# Patient Record
Sex: MALE | Race: WHITE | NOT HISPANIC OR LATINO | Employment: FULL TIME | URBAN - METROPOLITAN AREA
[De-identification: names, ages, dates, MRNs, and addresses within clinical notes are randomized per-mention and may not be internally consistent; named-entity substitution may affect disease eponyms.]

---

## 2023-02-17 PROBLEM — M21.41 FALLEN ARCHES: Status: ACTIVE | Noted: 2023-02-17

## 2023-02-17 PROBLEM — M47.816 OSTEOARTHRITIS OF LUMBAR SPINE: Status: ACTIVE | Noted: 2023-02-17

## 2023-02-17 PROBLEM — B35.6 TINEA CRURIS: Status: ACTIVE | Noted: 2023-02-17

## 2023-02-17 PROBLEM — N28.1 RENAL CYST, ACQUIRED: Status: ACTIVE | Noted: 2023-02-17

## 2023-02-17 PROBLEM — N52.9 MALE ERECTILE DISORDER OF ORGANIC ORIGIN: Status: ACTIVE | Noted: 2023-02-17

## 2023-02-17 PROBLEM — D12.6 TUBULAR ADENOMA OF COLON: Status: ACTIVE | Noted: 2023-02-17

## 2023-02-17 PROBLEM — Z97.3 WEARS CONTACT LENSES: Status: ACTIVE | Noted: 2023-02-17

## 2023-02-17 PROBLEM — M21.42 FALLEN ARCHES: Status: ACTIVE | Noted: 2023-02-17

## 2023-02-17 PROBLEM — K21.9 GERD WITHOUT ESOPHAGITIS: Status: ACTIVE | Noted: 2023-02-17

## 2023-02-17 PROBLEM — N40.0 BENIGN ENLARGEMENT OF PROSTATE: Status: ACTIVE | Noted: 2023-02-17

## 2023-02-17 PROBLEM — L40.9 PSORIASIS: Status: ACTIVE | Noted: 2023-02-17

## 2023-02-17 PROBLEM — L21.9 SEBORRHEA: Status: ACTIVE | Noted: 2023-02-17

## 2023-02-17 PROBLEM — B35.4 TINEA CORPORIS: Status: ACTIVE | Noted: 2023-02-17

## 2023-02-17 PROBLEM — Z97.3 WEARS GLASSES: Status: ACTIVE | Noted: 2023-02-17

## 2023-02-17 PROBLEM — E78.5 DYSLIPIDEMIA: Status: ACTIVE | Noted: 2023-02-17

## 2023-02-17 PROBLEM — E29.1 HYPOGONADISM MALE: Status: ACTIVE | Noted: 2023-02-17

## 2023-02-17 PROBLEM — R20.2 TINGLING IN EXTREMITIES: Status: ACTIVE | Noted: 2023-02-17

## 2023-02-17 PROBLEM — M17.12 PRIMARY OSTEOARTHRITIS OF LEFT KNEE: Status: ACTIVE | Noted: 2023-02-17

## 2023-02-17 PROBLEM — R97.20 INCREASED PROSTATE SPECIFIC ANTIGEN (PSA) VELOCITY: Status: ACTIVE | Noted: 2023-02-17

## 2023-02-17 PROBLEM — E55.9 VITAMIN D DEFICIENCY: Status: ACTIVE | Noted: 2023-02-17

## 2023-02-17 PROBLEM — G57.92 NEURITIS OF LEFT FOOT: Status: ACTIVE | Noted: 2023-02-17

## 2023-02-17 PROBLEM — G47.00 INSOMNIA: Status: ACTIVE | Noted: 2023-02-17

## 2023-02-17 PROBLEM — L03.039 PARONYCHIA OF TOENAIL: Status: ACTIVE | Noted: 2023-02-17

## 2023-02-17 PROBLEM — Z98.890 H/O MITRAL VALVE REPAIR: Status: ACTIVE | Noted: 2023-02-17

## 2023-02-17 RX ORDER — TESTOSTERONE 50 MG/5G
GEL TRANSDERMAL
COMMUNITY
Start: 2013-01-22 | End: 2023-04-27

## 2023-02-17 RX ORDER — HALOBETASOL PROPIONATE AND TAZAROTENE .1; .45 MG/G; MG/G
LOTION TOPICAL
COMMUNITY

## 2023-02-17 RX ORDER — TACROLIMUS 1 MG/G
OINTMENT TOPICAL
COMMUNITY

## 2023-02-17 RX ORDER — FLUOCINOLONE ACETONIDE 0.11 MG/ML
OIL TOPICAL
COMMUNITY

## 2023-02-17 RX ORDER — ZOLPIDEM TARTRATE 5 MG/1
TABLET ORAL
COMMUNITY
Start: 2015-08-27 | End: 2023-05-01

## 2023-02-17 RX ORDER — FAMOTIDINE 10 MG/1
10 TABLET ORAL DAILY PRN
COMMUNITY
End: 2023-03-31 | Stop reason: ALTCHOICE

## 2023-02-17 RX ORDER — SILDENAFIL CITRATE 20 MG/1
TABLET ORAL
COMMUNITY
Start: 2021-10-18 | End: 2023-03-31 | Stop reason: ALTCHOICE

## 2023-03-24 LAB
ALANINE AMINOTRANSFERASE (SGPT) (U/L) IN SER/PLAS: 18 U/L (ref 10–52)
ALBUMIN (G/DL) IN SER/PLAS: 4.3 G/DL (ref 3.4–5)
ALKALINE PHOSPHATASE (U/L) IN SER/PLAS: 57 U/L (ref 33–136)
ASPARTATE AMINOTRANSFERASE (SGOT) (U/L) IN SER/PLAS: 21 U/L (ref 9–39)
BILIRUBIN DIRECT (MG/DL) IN SER/PLAS: 0.2 MG/DL (ref 0–0.3)
BILIRUBIN TOTAL (MG/DL) IN SER/PLAS: 0.9 MG/DL (ref 0–1.2)
CHOLESTEROL (MG/DL) IN SER/PLAS: 183 MG/DL (ref 0–199)
CHOLESTEROL IN HDL (MG/DL) IN SER/PLAS: 65.9 MG/DL
CHOLESTEROL/HDL RATIO: 2.8
ERYTHROCYTE DISTRIBUTION WIDTH (RATIO) BY AUTOMATED COUNT: 14 % (ref 11.5–14.5)
ERYTHROCYTE MEAN CORPUSCULAR HEMOGLOBIN CONCENTRATION (G/DL) BY AUTOMATED: 32.5 G/DL (ref 32–36)
ERYTHROCYTE MEAN CORPUSCULAR VOLUME (FL) BY AUTOMATED COUNT: 100 FL (ref 80–100)
ERYTHROCYTES (10*6/UL) IN BLOOD BY AUTOMATED COUNT: 4.52 X10E12/L (ref 4.5–5.9)
HEMATOCRIT (%) IN BLOOD BY AUTOMATED COUNT: 45.3 % (ref 41–52)
HEMOGLOBIN (G/DL) IN BLOOD: 14.7 G/DL (ref 13.5–17.5)
LDL: 93 MG/DL (ref 0–99)
LEUKOCYTES (10*3/UL) IN BLOOD BY AUTOMATED COUNT: 5.9 X10E9/L (ref 4.4–11.3)
PLATELETS (10*3/UL) IN BLOOD AUTOMATED COUNT: 274 X10E9/L (ref 150–450)
PROTEIN TOTAL: 7.1 G/DL (ref 6.4–8.2)
THYROTROPIN (MIU/L) IN SER/PLAS BY DETECTION LIMIT <= 0.05 MIU/L: 2.96 MIU/L (ref 0.44–3.98)
TRIGLYCERIDE (MG/DL) IN SER/PLAS: 119 MG/DL (ref 0–149)
VLDL: 24 MG/DL (ref 0–40)

## 2023-03-30 LAB
TESTOSTERONE FREE (CHAN): 113.4 PG/ML (ref 35–155)
TESTOSTERONE,TOTAL,LC-MS/MS: 751 NG/DL (ref 250–1100)

## 2023-03-31 ENCOUNTER — OFFICE VISIT (OUTPATIENT)
Dept: PRIMARY CARE | Facility: CLINIC | Age: 63
End: 2023-03-31
Payer: COMMERCIAL

## 2023-03-31 VITALS
DIASTOLIC BLOOD PRESSURE: 80 MMHG | WEIGHT: 167.2 LBS | SYSTOLIC BLOOD PRESSURE: 119 MMHG | OXYGEN SATURATION: 99 % | HEIGHT: 74 IN | TEMPERATURE: 97.5 F | HEART RATE: 70 BPM | BODY MASS INDEX: 21.46 KG/M2

## 2023-03-31 DIAGNOSIS — E29.1 HYPOGONADISM MALE: Chronic | ICD-10-CM

## 2023-03-31 DIAGNOSIS — G47.01 INSOMNIA DUE TO MEDICAL CONDITION: Chronic | ICD-10-CM

## 2023-03-31 DIAGNOSIS — N40.1 BENIGN PROSTATIC HYPERPLASIA WITH NOCTURIA: Primary | Chronic | ICD-10-CM

## 2023-03-31 DIAGNOSIS — Z98.890 H/O MITRAL VALVE REPAIR: ICD-10-CM

## 2023-03-31 DIAGNOSIS — R35.1 BENIGN PROSTATIC HYPERPLASIA WITH NOCTURIA: Primary | Chronic | ICD-10-CM

## 2023-03-31 PROBLEM — N40.0 BENIGN ENLARGEMENT OF PROSTATE: Chronic | Status: ACTIVE | Noted: 2023-02-17

## 2023-03-31 PROBLEM — G47.00 INSOMNIA: Chronic | Status: ACTIVE | Noted: 2023-02-17

## 2023-03-31 PROCEDURE — 1036F TOBACCO NON-USER: CPT | Performed by: NURSE PRACTITIONER

## 2023-03-31 PROCEDURE — 99214 OFFICE O/P EST MOD 30 MIN: CPT | Performed by: NURSE PRACTITIONER

## 2023-03-31 ASSESSMENT — ENCOUNTER SYMPTOMS
OCCASIONAL FEELINGS OF UNSTEADINESS: 0
DEPRESSION: 0
LOSS OF SENSATION IN FEET: 0

## 2023-03-31 ASSESSMENT — PATIENT HEALTH QUESTIONNAIRE - PHQ9
2. FEELING DOWN, DEPRESSED OR HOPELESS: NOT AT ALL
SUM OF ALL RESPONSES TO PHQ9 QUESTIONS 1 AND 2: 0
1. LITTLE INTEREST OR PLEASURE IN DOING THINGS: NOT AT ALL

## 2023-03-31 NOTE — ASSESSMENT & PLAN NOTE
Follow with cardiology.  Slightly dilated aortic arch Hx.   Semi annual ECHO and EKG. Scheduled for May 2023.

## 2023-03-31 NOTE — ASSESSMENT & PLAN NOTE
Most recent testosterone levels were adequate. He is not convinced this is true. Has opted to remain on current dose given BPH despite low testosterone levels in the recent past.  Will continue to check annually.

## 2023-03-31 NOTE — ASSESSMENT & PLAN NOTE
Symptoms have been stable; Last PSA in Oct 2022.   Nocturia is keeping him up at night.  He saw Dr. Snyder who would like to see him as needed if PSA is >4.

## 2023-03-31 NOTE — ASSESSMENT & PLAN NOTE
Frequent nocturia has made his sleep more interrupted. He averages 4-5 hours per night. Has done well on 5 mg of Ambien for a long time but feels he needs a higher dose recently.   Will increase to 10 mg PRN at bedtime and see if this helps.

## 2023-03-31 NOTE — PROGRESS NOTES
Subjective   Patient ID: Bob Smith is a 62 y.o. male who presents for Follow-up.    Patient of Dr. Browning.    Presents today for 6 month follow up on chronic medical conditions.    States overall doing well. Had labs done recently which were reviewed with patient and stable.    INSOMNIA  On Ambien 5 mg.  Getting worse with age. States up a lot to use the bathroom at night.  4-5 hours on average.    He continues to work and walk a lot at work. He remains active.    HX of MV Repair and Aortic arch dilation  Has upcoming ECHO with cardiologist in may 2023.     HYPOGONADISM  On testosterone 5 mg gel  Recent testosterone levels were adequate. Had been low for some time. Unclear why sudden rise despite no change in therapy. He is not convinced of lab accuracy.     OARRS:  No data recorded  I have personally reviewed the OARRS report for Bob Smith. I have considered the risks of abuse, dependence, addiction and diversion    Is the patient prescribed a combination of a benzodiazepine and opioid?  No    Last Urine Drug Screen / ordered today: No  Recent Results (from the past 41650 hour(s))  -OPIATE/OPIOID/BENZO PRESCRIPTION COMPLIANCE:   Collection Time: 10/05/22  6:35 AM       Result                      Value             Ref Range           DRUG SCREEN COMMENT UR*     SEE BELOW                             Creatine, Urine             142.5             mg/dL               Amphetamine Screen, Ur*                       NEGATIVE        PRESUMPTIVE NEGATIVE       Barbiturate Screen, Ur*                       NEGATIVE        PRESUMPTIVE NEGATIVE       Cannabinoid Screen, Ur*                       NEGATIVE        PRESUMPTIVE NEGATIVE       Cocaine Screen, Urine                         NEGATIVE        PRESUMPTIVE NEGATIVE       PCP Screen, Urine                             NEGATIVE        PRESUMPTIVE NEGATIVE       7-Aminoclonazepam           <25               Cutoff <25 n*       Alpha-Hydroxyalprazolam     <25                Cutoff <25 n*       Alpha-Hydroxymidazolam      <25               Cutoff <25 n*       Alprazolam                  <25               Cutoff <25 n*       Chlordiazepoxide            <25               Cutoff <25 n*       Clonazepam                  <25               Cutoff <25 n*       Diazepam                    <25               Cutoff <25 n*       Lorazepam                   <25               Cutoff <25 n*       Midazolam                   <25               Cutoff <25 n*       Nordiazepam                 <25               Cutoff <25 n*       Oxazepam                    <25               Cutoff <25 n*       Temazepam                   <25               Cutoff <25 n*       Zolpidem                    <25               Cutoff <25 n*       Zolpidem Metabolite (Z*     244 (A)           Cutoff <25 n*       6-Acetylmorphine            <25               Cutoff <25 n*       Codeine                     <50               Cutoff <50 n*       Hydrocodone                 <25               Cutoff <25 n*       Hydromorphone               <25               Cutoff <25 n*       Morphine Urine              <50               Cutoff <50 n*       Norhydrocodone              <25               Cutoff <25 n*       Noroxycodone                <25               Cutoff <25 n*       Oxycodone                   <25               Cutoff <25 n*       Oxymorphone                 <25               Cutoff <25 n*       Tramadol                    <50               Cutoff <50 n*       O-Desmethyltramadol         <50               Cutoff <50 n*       Fentanyl                    <2.5              Cutoff<2.5 n*       Norfentanyl                 <2.5              Cutoff<2.5 n*       METHADONE CONFIRMATION*     <25               Cutoff <25 n*       EDDP                        <25               Cutoff <25 n*  -Drug Screen, Urine With Reflex to Confirmation:   Collection Time: 10/04/21  6:42 AM       Result                      Value             Ref Range            DRUG SCREEN COMMENT UR*     SEE BELOW                             Amphetamine Screen, Ur*                       NEGATIVE        PRESUMPTIVE NEGATIVE       Barbiturate Screen, Ur*                       NEGATIVE        PRESUMPTIVE NEGATIVE       BENZODIAZEPINE (PRESEN*                       NEGATIVE        PRESUMPTIVE NEGATIVE       Cannabinoid Screen, Ur*                       NEGATIVE        PRESUMPTIVE NEGATIVE       Cocaine Screen, Urine                         NEGATIVE        PRESUMPTIVE NEGATIVE       Fentanyl, Ur                                  NEGATIVE        PRESUMPTIVE NEGATIVE       Methadone Screen, Urine                       NEGATIVE        PRESUMPTIVE NEGATIVE       Opiate Screen, Urine                          NEGATIVE        PRESUMPTIVE NEGATIVE       Oxycodone Screen, Ur                          NEGATIVE        PRESUMPTIVE NEGATIVE       PCP Screen, Urine                             NEGATIVE        PRESUMPTIVE NEGATIVE  Results are as expected.     Controlled Substance Agreement:  Date of the Last Agreement: 4/2022    Reviewed Controlled Substance Agreement including but not limited to the benefits, risks, and alternatives to treatment with a Controlled Substance medication(s).    Sleep Aids:   What is the patient's goal of therapy? Improved sleep  Is this being achieved with current treatment? No; needs dose adjustment    Activities of Daily Living:   Is your overall impression that this patient is benefiting (symptom reduction outweighs side effects) from sleep aid therapy? Yes     1. Physical Functioning: Better  2. Family Relationship: Better  3. Social Relationship: Better  4. Mood: Better  5. Sleep Patterns: Better  6. Overall Function: Better       and Testosterone:  What is the patient's goal of therapy? Improved testosterone levels  Is this being achieved with current treatment? Yes  Date of last check March 2023    I attest the patient does not have: Breast Cancer, Polycythemia, or  Prostate Cancer    Last Testosterone check:  Testosterone, Free       Date                     Value               Ref Range           Status                03/24/2023               113.4               35.0 - 155.0 p*     Final              Comment:    This test was developed and its analytical performance  characteristics have been determined by [email or web address]. It has  not been cleared or approved by the U.S. Food and Drug  Administration. This assay has been validated pursuant  to the CLIA regulations and is used for clinical  purposes.    ----------  Testosterone, Total, LC-MS/MS       Date                     Value               Ref Range           Status                03/24/2023               751                 250 - 1,100 ng*     Final              Comment:    Men with clinically significant hypogonadal  symptoms and testosterone values repeatedly in  the range of the 200-300 ng/dL or less, may  benefit from testosterone treatment after  adequate risk and benefits counseling.            For additional information, please refer to  http://education.[employer]/faq/  [email or web address]  (This link is being provided for informational/  educational purposes only.)     This test was developed and its analytical performance  characteristics have been determined by [email or web address]. It has  not been cleared or approved by the U.S. Food and Drug  Administration. This assay has been validated pursuant  to the CLIA regulations and is used for clinical  purposes.    ----------    Last CBC:    No results found for: CBCDIF, BMCBC, PR1    Last PSA:   PSA       Date                     Value               Ref Range           Status                10/21/2022               3.21                0.00 - 4.00 ng*     Final              Comment:    The FDA requires that the method used for PSA assay be   reported to the physician. Values  "obtained with different   assay methods must not be used interchangeably. This test  was performed at [facility] using the Access   [employer] PSA assay is a two-site immunoenzymatic sandwich   assay. The assay is approved for measurement of   prostate-specific antigen (PSA)in serum and may be used   in conjunction with a digital rectal examination in men   50 years and older as an aid in detection of prostate   cancer.  5-Alpha-reductase inhibitors (e.g. Proscar, Finasteride,   Avodart, Dutasteride and Ana Maria) for the treatment of BPH   have been shown to lower PSA levels by an average of 50%   after 6 months of treatment.    ----------    Activities of Daily Living:   Is your overall impression that this patient is benefiting (symptom reduction outweighs side effects) from testosterone therapy? Yes     1. Physical Functioning: Better  2. Family Relationship: Better  3. Social Relationship: Better  4. Mood: Better  5. Sleep Patterns: Better  6. Overall Function: Better                   Review of Systems  otherwise negative aside from what was mentioned above in HPI.    Objective   /80   Pulse 70   Temp 36.4 °C (97.5 °F)   Ht 1.88 m (6' 2\")   Wt 75.8 kg (167 lb 3.2 oz)   SpO2 99%   BMI 21.47 kg/m²     Physical Exam  Constitutional:       Appearance: Normal appearance. He is normal weight.   HENT:      Right Ear: Tympanic membrane normal.      Left Ear: Tympanic membrane normal.   Eyes:      Conjunctiva/sclera: Conjunctivae normal.   Cardiovascular:      Rate and Rhythm: Normal rate and regular rhythm.   Pulmonary:      Effort: Pulmonary effort is normal.      Breath sounds: Normal breath sounds.   Abdominal:      General: Bowel sounds are normal.      Palpations: Abdomen is soft.      Tenderness: There is no right CVA tenderness or left CVA tenderness.   Musculoskeletal:         General: Normal range of motion.   Skin:     General: Skin is warm and dry.   Neurological:      General: No focal " deficit present.      Mental Status: He is alert.   Psychiatric:         Mood and Affect: Mood normal.         Thought Content: Thought content normal.         Assessment/Plan   Problem List Items Addressed This Visit          Nervous    Insomnia (Chronic)     Frequent nocturia has made his sleep more interrupted. He averages 4-5 hours per night. Has done well on 5 mg of Ambien for a long time but feels he needs a higher dose recently.   Will increase to 10 mg PRN at bedtime and see if this helps.            Circulatory    H/O mitral valve repair (Chronic)     Follow with cardiology.  Slightly dilated aortic arch Hx.   Semi annual ECHO and EKG. Scheduled for May 2023.            Genitourinary    Benign enlargement of prostate - Primary (Chronic)     Symptoms have been stable; Last PSA in Oct 2022.   Nocturia is keeping him up at night.  He saw Dr. Snyder who would like to see him as needed if PSA is >4.              Endocrine/Metabolic    Hypogonadism male (Chronic)     Most recent testosterone levels were adequate. He is not convinced this is true. Has opted to remain on current dose given BPH despite low testosterone levels in the recent past.  Will continue to check annually.        Keep PCP follow CPE as scheduled

## 2023-04-27 DIAGNOSIS — E29.1 TESTICULAR HYPOFUNCTION: ICD-10-CM

## 2023-04-27 RX ORDER — TESTOSTERONE 50 MG/5G
GEL TRANSDERMAL
Qty: 450 G | Refills: 0 | Status: SHIPPED | OUTPATIENT
Start: 2023-04-27 | End: 2023-10-25 | Stop reason: ALTCHOICE

## 2023-05-01 DIAGNOSIS — G47.00 INSOMNIA, UNSPECIFIED TYPE: Primary | Chronic | ICD-10-CM

## 2023-05-01 RX ORDER — ZOLPIDEM TARTRATE 5 MG/1
TABLET ORAL
OUTPATIENT
Start: 2023-05-01

## 2023-05-01 RX ORDER — ZOLPIDEM TARTRATE 5 MG/1
TABLET ORAL
Qty: 90 TABLET | Refills: 0 | Status: SHIPPED | OUTPATIENT
Start: 2023-05-01 | End: 2024-01-15 | Stop reason: SDUPTHER

## 2023-05-01 NOTE — TELEPHONE ENCOUNTER
Pt needs refill for Ambien 10mg - was increased from 5mg to 10mg from last office visit with Leatha - please send to [facility] in [place].

## 2023-05-08 DIAGNOSIS — G47.00 INSOMNIA, UNSPECIFIED TYPE: Chronic | ICD-10-CM

## 2023-05-08 RX ORDER — ZOLPIDEM TARTRATE 5 MG/1
TABLET ORAL
Qty: 90 TABLET | Refills: 0 | Status: CANCELLED | OUTPATIENT
Start: 2023-05-08

## 2023-10-04 ENCOUNTER — TELEPHONE (OUTPATIENT)
Dept: PRIMARY CARE | Facility: CLINIC | Age: 63
End: 2023-10-04
Payer: COMMERCIAL

## 2023-10-04 DIAGNOSIS — E78.5 DYSLIPIDEMIA: ICD-10-CM

## 2023-10-04 DIAGNOSIS — E55.9 VITAMIN D DEFICIENCY: Primary | ICD-10-CM

## 2023-10-04 DIAGNOSIS — R35.1 BENIGN PROSTATIC HYPERPLASIA WITH NOCTURIA: ICD-10-CM

## 2023-10-04 DIAGNOSIS — N40.1 BENIGN PROSTATIC HYPERPLASIA WITH NOCTURIA: ICD-10-CM

## 2023-10-04 DIAGNOSIS — Z79.899 MEDICATION MANAGEMENT: ICD-10-CM

## 2023-10-04 DIAGNOSIS — E29.1 HYPOGONADISM MALE: Chronic | ICD-10-CM

## 2023-10-04 DIAGNOSIS — G47.01 INSOMNIA DUE TO MEDICAL CONDITION: ICD-10-CM

## 2023-10-06 ENCOUNTER — LAB (OUTPATIENT)
Dept: LAB | Facility: LAB | Age: 63
End: 2023-10-06
Payer: COMMERCIAL

## 2023-10-06 DIAGNOSIS — N40.1 BENIGN PROSTATIC HYPERPLASIA WITH NOCTURIA: ICD-10-CM

## 2023-10-06 DIAGNOSIS — E55.9 VITAMIN D DEFICIENCY: ICD-10-CM

## 2023-10-06 DIAGNOSIS — E78.5 DYSLIPIDEMIA: ICD-10-CM

## 2023-10-06 DIAGNOSIS — G47.01 INSOMNIA DUE TO MEDICAL CONDITION: ICD-10-CM

## 2023-10-06 DIAGNOSIS — Z79.899 MEDICATION MANAGEMENT: ICD-10-CM

## 2023-10-06 DIAGNOSIS — R35.1 BENIGN PROSTATIC HYPERPLASIA WITH NOCTURIA: ICD-10-CM

## 2023-10-06 DIAGNOSIS — E29.1 HYPOGONADISM MALE: Chronic | ICD-10-CM

## 2023-10-06 LAB
25(OH)D3 SERPL-MCNC: 33 NG/ML (ref 30–100)
ALBUMIN SERPL BCP-MCNC: 4.3 G/DL (ref 3.4–5)
ALP SERPL-CCNC: 60 U/L (ref 33–136)
ALT SERPL W P-5'-P-CCNC: 15 U/L (ref 10–52)
AMPHETAMINES UR QL SCN: NORMAL
ANION GAP SERPL CALC-SCNC: 13 MMOL/L (ref 10–20)
AST SERPL W P-5'-P-CCNC: 19 U/L (ref 9–39)
BARBITURATES UR QL SCN: NORMAL
BENZODIAZ UR QL SCN: NORMAL
BILIRUB SERPL-MCNC: 0.7 MG/DL (ref 0–1.2)
BUN SERPL-MCNC: 16 MG/DL (ref 6–23)
BZE UR QL SCN: NORMAL
CALCIUM SERPL-MCNC: 9.5 MG/DL (ref 8.6–10.3)
CANNABINOIDS UR QL SCN: NORMAL
CHLORIDE SERPL-SCNC: 104 MMOL/L (ref 98–107)
CHOLEST SERPL-MCNC: 191 MG/DL (ref 0–199)
CHOLESTEROL/HDL RATIO: 2.6
CO2 SERPL-SCNC: 31 MMOL/L (ref 21–32)
CREAT SERPL-MCNC: 1 MG/DL (ref 0.5–1.3)
ERYTHROCYTE [DISTWIDTH] IN BLOOD BY AUTOMATED COUNT: 14.2 % (ref 11.5–14.5)
FENTANYL+NORFENTANYL UR QL SCN: NORMAL
GFR SERPL CREATININE-BSD FRML MDRD: 85 ML/MIN/1.73M*2
GLUCOSE SERPL-MCNC: 103 MG/DL (ref 74–99)
HCT VFR BLD AUTO: 41.6 % (ref 41–52)
HDLC SERPL-MCNC: 74.8 MG/DL
HGB BLD-MCNC: 14 G/DL (ref 13.5–17.5)
LDLC SERPL CALC-MCNC: 97 MG/DL (ref 140–190)
MCH RBC QN AUTO: 33.1 PG (ref 26–34)
MCHC RBC AUTO-ENTMCNC: 33.7 G/DL (ref 32–36)
MCV RBC AUTO: 98 FL (ref 80–100)
NON HDL CHOLESTEROL: 116 MG/DL (ref 0–149)
NRBC BLD-RTO: 0 /100 WBCS (ref 0–0)
OPIATES UR QL SCN: NORMAL
OXYCODONE+OXYMORPHONE UR QL SCN: NORMAL
PCP UR QL SCN: NORMAL
PLATELET # BLD AUTO: 301 X10*3/UL (ref 150–450)
PMV BLD AUTO: 10 FL (ref 7.5–11.5)
POTASSIUM SERPL-SCNC: 4.7 MMOL/L (ref 3.5–5.3)
PROT SERPL-MCNC: 7.2 G/DL (ref 6.4–8.2)
PSA SERPL-MCNC: 3.25 NG/ML
RBC # BLD AUTO: 4.23 X10*6/UL (ref 4.5–5.9)
SODIUM SERPL-SCNC: 143 MMOL/L (ref 136–145)
TRIGL SERPL-MCNC: 98 MG/DL (ref 0–149)
TSH SERPL-ACNC: 2.42 MIU/L (ref 0.44–3.98)
VLDL: 20 MG/DL (ref 0–40)
WBC # BLD AUTO: 5.5 X10*3/UL (ref 4.4–11.3)

## 2023-10-06 PROCEDURE — 36415 COLL VENOUS BLD VENIPUNCTURE: CPT

## 2023-10-06 PROCEDURE — 85027 COMPLETE CBC AUTOMATED: CPT

## 2023-10-06 PROCEDURE — 84153 ASSAY OF PSA TOTAL: CPT

## 2023-10-06 PROCEDURE — 80061 LIPID PANEL: CPT

## 2023-10-06 PROCEDURE — 80053 COMPREHEN METABOLIC PANEL: CPT

## 2023-10-06 PROCEDURE — 80368 SEDATIVE HYPNOTICS: CPT

## 2023-10-06 PROCEDURE — 82306 VITAMIN D 25 HYDROXY: CPT

## 2023-10-06 PROCEDURE — 84443 ASSAY THYROID STIM HORMONE: CPT

## 2023-10-06 PROCEDURE — 80307 DRUG TEST PRSMV CHEM ANLYZR: CPT

## 2023-10-06 PROCEDURE — 84402 ASSAY OF FREE TESTOSTERONE: CPT

## 2023-10-09 DIAGNOSIS — G47.01 INSOMNIA DUE TO MEDICAL CONDITION: Primary | ICD-10-CM

## 2023-10-12 LAB
TESTOSTERONE FREE (CHAN): 46.9 PG/ML (ref 35–155)
TESTOSTERONE,TOTAL,LC-MS/MS: 455 NG/DL (ref 250–1100)

## 2023-10-17 LAB
ZOLPIDEM UR CFM-MCNC: >1000 NG/ML
ZOLPIDEM UR-MCNC: 28 NG/ML

## 2023-10-25 ENCOUNTER — OFFICE VISIT (OUTPATIENT)
Dept: PRIMARY CARE | Facility: CLINIC | Age: 63
End: 2023-10-25
Payer: COMMERCIAL

## 2023-10-25 VITALS
RESPIRATION RATE: 16 BRPM | WEIGHT: 160.6 LBS | DIASTOLIC BLOOD PRESSURE: 77 MMHG | HEIGHT: 74 IN | SYSTOLIC BLOOD PRESSURE: 120 MMHG | HEART RATE: 70 BPM | TEMPERATURE: 97.5 F | BODY MASS INDEX: 20.61 KG/M2 | OXYGEN SATURATION: 100 %

## 2023-10-25 DIAGNOSIS — Z00.00 HEALTHCARE MAINTENANCE: ICD-10-CM

## 2023-10-25 DIAGNOSIS — D12.6 TUBULAR ADENOMA OF COLON: Chronic | ICD-10-CM

## 2023-10-25 DIAGNOSIS — K58.9 IRRITABLE BOWEL SYNDROME, UNSPECIFIED TYPE: Chronic | ICD-10-CM

## 2023-10-25 DIAGNOSIS — R35.1 BENIGN PROSTATIC HYPERPLASIA WITH NOCTURIA: Chronic | ICD-10-CM

## 2023-10-25 DIAGNOSIS — N40.1 BENIGN PROSTATIC HYPERPLASIA WITH NOCTURIA: Chronic | ICD-10-CM

## 2023-10-25 DIAGNOSIS — E55.9 VITAMIN D DEFICIENCY: Chronic | ICD-10-CM

## 2023-10-25 DIAGNOSIS — E78.5 DYSLIPIDEMIA, GOAL LDL BELOW 100: Chronic | ICD-10-CM

## 2023-10-25 DIAGNOSIS — H40.10X0 OPEN-ANGLE GLAUCOMA OF RIGHT EYE, UNSPECIFIED GLAUCOMA STAGE, UNSPECIFIED OPEN-ANGLE GLAUCOMA TYPE: Chronic | ICD-10-CM

## 2023-10-25 DIAGNOSIS — L40.9 PSORIASIS: Chronic | ICD-10-CM

## 2023-10-25 DIAGNOSIS — Z98.890 H/O MITRAL VALVE REPAIR: Primary | Chronic | ICD-10-CM

## 2023-10-25 PROBLEM — E29.1 HYPOGONADISM MALE: Chronic | Status: RESOLVED | Noted: 2023-02-17 | Resolved: 2023-10-25

## 2023-10-25 PROCEDURE — 93000 ELECTROCARDIOGRAM COMPLETE: CPT | Performed by: INTERNAL MEDICINE

## 2023-10-25 PROCEDURE — 1036F TOBACCO NON-USER: CPT | Performed by: INTERNAL MEDICINE

## 2023-10-25 PROCEDURE — 99396 PREV VISIT EST AGE 40-64: CPT | Performed by: INTERNAL MEDICINE

## 2023-10-25 RX ORDER — LATANOPROST 50 UG/ML
1 SOLUTION/ DROPS OPHTHALMIC
COMMUNITY
Start: 2023-09-01

## 2023-10-25 ASSESSMENT — ENCOUNTER SYMPTOMS
DEPRESSION: 0
OCCASIONAL FEELINGS OF UNSTEADINESS: 0
LOSS OF SENSATION IN FEET: 0

## 2023-10-25 ASSESSMENT — PATIENT HEALTH QUESTIONNAIRE - PHQ9
SUM OF ALL RESPONSES TO PHQ9 QUESTIONS 1 AND 2: 0
2. FEELING DOWN, DEPRESSED OR HOPELESS: NOT AT ALL
1. LITTLE INTEREST OR PLEASURE IN DOING THINGS: NOT AT ALL

## 2023-10-25 NOTE — PROGRESS NOTES
"Subjective   Patient ID: Bob Smith is a 63 y.o. male who presents for Annual Exam.    Here for wellness visit.  Overall doing well without illnesses or injuries.  Remains active.    No exertional chest pain, palpitations, dizziness, orthopnea or pedal edema.    Walking 60 to 90 minutes 5 days weekly in the [facility] [place] [employer] [facility]         Review of Systems    Objective   /77 (BP Location: Left arm, Patient Position: Sitting, BP Cuff Size: Adult)   Pulse 70   Temp 36.4 °C (97.5 °F)   Resp 16   Ht 1.88 m (6' 2\")   Wt 72.8 kg (160 lb 9.6 oz)   SpO2 100%   BMI 20.62 kg/m²     Physical Exam  Constitutional:       Appearance: Normal appearance.   HENT:      Head: Normocephalic and atraumatic.      Right Ear: Tympanic membrane normal.      Left Ear: Tympanic membrane normal.      Nose: Nose normal.   Eyes:      General: No scleral icterus.     Extraocular Movements: Extraocular movements intact.      Conjunctiva/sclera: Conjunctivae normal.      Pupils: Pupils are equal, round, and reactive to light.   Cardiovascular:      Rate and Rhythm: Normal rate and regular rhythm.      Pulses: Normal pulses.      Heart sounds: Murmur heard.   Pulmonary:      Effort: Pulmonary effort is normal. No respiratory distress.      Breath sounds: Normal breath sounds. No stridor. No wheezing.   Abdominal:      General: Abdomen is flat. Bowel sounds are normal. There is no distension.      Palpations: Abdomen is soft. There is no mass.      Tenderness: There is no abdominal tenderness. There is no guarding.   Musculoskeletal:         General: No swelling, tenderness or deformity. Normal range of motion.      Cervical back: Normal range of motion and neck supple. No tenderness.   Lymphadenopathy:      Cervical: No cervical adenopathy.   Skin:     General: Skin is warm and dry.      Findings: No lesion or rash.   Neurological:      General: No focal deficit present.      Mental Status: He is alert and oriented to person, place, and time.    "   Cranial Nerves: No cranial nerve deficit.      Motor: No weakness.   Psychiatric:         Mood and Affect: Mood normal.         Behavior: Behavior normal.         Thought Content: Thought content normal.         Judgment: Judgment normal.         Assessment/Plan   Problem List Items Addressed This Visit             ICD-10-CM    Vitamin D deficiency E55.9    Tubular adenoma of colon D12.6    Psoriasis L40.9    Dyslipidemia, goal LDL below 100 E78.5    Benign enlargement of prostate (Chronic) N40.0    H/O mitral valve repair - Primary (Chronic) Z98.890    Open-angle glaucoma of right eye H40.10X0    Irritable bowel syndrome K58.9     Other Visit Diagnoses         Codes    Healthcare maintenance     Z00.00    Relevant Orders    ECG 12 lead (Clinic Performed) (Completed)            Sleep Aids:   What is the patient's goal of therapy? To help w/ sleep  Is this being achieved with current treatment? yes    Activities of Daily Living:   Is your overall impression that this patient is benefiting (symptom reduction outweighs side effects) from sleep aid therapy? Yes     1. Physical Functioning: Better  2. Family Relationship: Better  3. Social Relationship: Better  4. Mood: Better  5. Sleep Patterns: Better  6. Overall Function: Better      CSA:  10/25/23  PDMP: 10/25/23  UDS:   10/6/23           Regarding history of slightly dilated aortic arch history of mitral valve repair 2007 -          he saw his cardiologist, Dr. Montes for an echo in the spring 2023. Stable echo.        He will see her in the office late October 2023     exercise routine-he continues to work out regularly, most recently with walking-60 to 90 minutes 5 days weekly in the [place].  He also does stretching     Dyslipidemia- 10 year cardiac risk favorable late 2016. We'll follow. Annual spring lipids 4/22 show LDL at 100, but HDL a bit worse than last time.  Goal LDL under 100  10/23-LDL 97     Elevated PSA velocity-negative biopsy Jan '21 per   [facility]. He saw him back in July 2021- PSA had decreased-he recommended following this every 6 months-ordered   PSA up again in 4/22. He'll see Dr. Snyder in several weeks as scheduled. I told him I am concerned about testosterone replacement therapy in a 60 year-old. He will specifically asked her Dr. Snyder if this testosterone therapy is still a safe option for him.               10/23-he discontinued testosterone.  PSA stable 10/23     Hx hypogonadism/BPH/long-standing infrequent epididymitis/erectile dysfunction-though he was on testosterone for years-this was discontinued late 2022     Benign appearing Left renal cyst-unremarkable renal ultrasound late 2016. He notes in 2021- at [facility] another renal ultrasound was done after his cardiology visit and his echo showed a concern.     History of Right lateral lumbar pain/lumbar arthritis-caution with lifting standing and positioning and his chores around the house and follow-up with concerns. not an active issue now. He understands importance of consistent stretching        Acid reflux-stable with medication. He will try to use this as needed. No swallowing difficulties. He notes this is almost over seasonal issue. uses Pepcid AC as needed, typically seasonally     Fallen arches -he has had metatarsalgia before as well as toe tingling weightbearing. Encouraged optimal fitting shoes with optimal arch support. Presently less symptomatic. Have encouraged podiatry follow-up in the past with concerns   Much improved with inserts from [employer] shoe store in [place]- he goes out every 12-18 months for new inserts     Plantar wart-in the past, noted fifth MTP area suggested soaking and pumice stone. Encouraged podiatry for ingrown toenail     Right shoulder soreness - he saw [facility] summer '20. X-ray OK. improved     Left knee DJD/history of knee effusion-not problematic. He will see orthopedics as needed, following injection per Dr. Corral  in 2017. Knee better since he gave up running. walks 1 hr 7 days/wk    Irritable bowel symptoms-improved following recommendation from gastro practitioner-he is on IBgard which helps      Colon polyps / history of external hemorrhoids- we'll continue colonoscopy surveillance as recommended by gastroenterology, with the next colonoscopy as below. Regular ample fiber intake encouraged as well. Third colonoscopy updated 2015; next in 2020. ordered at several recent visits             Colonoscopy scheduled late November 2023    Vitamin D deficiency- encouraged patient to continue vitamin D daily as ordered. Will consider vitamin D level regularly.             He is off extra vitamin D because of dyspepsia.  He will try a multivitamin with his 10/23 vitamin D borderline low.        Psoriasis /seborrhea-  RE Skin care- Sees the NP in dermatology annually.           Groin psoriasis main issue, late fall & winter.  Topicals remain helpful     Cerumen concern-he prefers to follow with his ENT doctor, Dr. Raymond semiannually.                10/23-no cerumen on exam.  He will [facility] with [facility] with concerns.     Dental care-encouraged biannually. He goes 3 times yearly; UTD.     Glasses / Vision care-he will continue with Dr. Stone annually- UTD        open-angle glaucoma-right eye-he now is on eyedrops under the direction of Dr. Elvis Cohen     Blepharitis- ongoing for approximately 4 weeks. Encouraged using a warm compress. He actually describes what may be a right lower eyelid hordeolum. Resolved. Again he will follow-up with his eye doctor with concerns        senior care / part time job-- retired at the end of January 2021. 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 Three booster shots completed.  He will consider a booster in December, 3  months after his COVID illness early September 2023    Discussed RSV vaccination.  He may consider getting this though his wife had a reaction to the vaccination     Shingrix series updated 2019     Follow-up every 6 months, sooner as needed     Charting was completed using voice recognition technology and may include unintended errors.

## 2023-11-10 DIAGNOSIS — G47.09 OTHER INSOMNIA: Primary | Chronic | ICD-10-CM

## 2023-11-10 RX ORDER — ZOLPIDEM TARTRATE 10 MG/1
10 TABLET ORAL NIGHTLY PRN
Qty: 90 TABLET | Refills: 0 | Status: SHIPPED
Start: 2023-11-10 | End: 2023-11-30 | Stop reason: ALTCHOICE

## 2023-11-30 ENCOUNTER — ANESTHESIA (OUTPATIENT)
Dept: OPERATING ROOM | Facility: CLINIC | Age: 63
End: 2023-11-30
Payer: COMMERCIAL

## 2023-11-30 ENCOUNTER — HOSPITAL ENCOUNTER (OUTPATIENT)
Dept: OPERATING ROOM | Facility: CLINIC | Age: 63
Setting detail: OUTPATIENT SURGERY
Discharge: HOME | End: 2023-11-30
Payer: COMMERCIAL

## 2023-11-30 ENCOUNTER — ANESTHESIA EVENT (OUTPATIENT)
Dept: OPERATING ROOM | Facility: CLINIC | Age: 63
End: 2023-11-30
Payer: COMMERCIAL

## 2023-11-30 VITALS
HEIGHT: 74 IN | SYSTOLIC BLOOD PRESSURE: 122 MMHG | DIASTOLIC BLOOD PRESSURE: 76 MMHG | WEIGHT: 157.85 LBS | BODY MASS INDEX: 20.26 KG/M2 | OXYGEN SATURATION: 100 % | TEMPERATURE: 97.2 F | RESPIRATION RATE: 16 BRPM | HEART RATE: 61 BPM

## 2023-11-30 DIAGNOSIS — D12.6 BENIGN NEOPLASM OF COLON, UNSPECIFIED: ICD-10-CM

## 2023-11-30 DIAGNOSIS — Z12.11 ENCOUNTER FOR SCREENING FOR MALIGNANT NEOPLASM OF COLON: ICD-10-CM

## 2023-11-30 PROCEDURE — 3600000002 HC OR TIME - INITIAL BASE CHARGE - PROCEDURE LEVEL TWO: Performed by: ANESTHESIOLOGY

## 2023-11-30 PROCEDURE — 7100000009 HC PHASE TWO TIME - INITIAL BASE CHARGE: Performed by: ANESTHESIOLOGY

## 2023-11-30 PROCEDURE — 88305 TISSUE EXAM BY PATHOLOGIST: CPT | Mod: TC,SUR,ELYLAB | Performed by: INTERNAL MEDICINE

## 2023-11-30 PROCEDURE — 45380 COLONOSCOPY AND BIOPSY: CPT | Performed by: INTERNAL MEDICINE

## 2023-11-30 PROCEDURE — 2500000005 HC RX 250 GENERAL PHARMACY W/O HCPCS: Performed by: ANESTHESIOLOGIST ASSISTANT

## 2023-11-30 PROCEDURE — 45385 COLONOSCOPY W/LESION REMOVAL: CPT | Performed by: INTERNAL MEDICINE

## 2023-11-30 PROCEDURE — A45378 PR COLONOSCOPY,DIAGNOSTIC: Performed by: ANESTHESIOLOGY

## 2023-11-30 PROCEDURE — A45378 PR COLONOSCOPY,DIAGNOSTIC: Performed by: ANESTHESIOLOGIST ASSISTANT

## 2023-11-30 PROCEDURE — 3700000001 HC GENERAL ANESTHESIA TIME - INITIAL BASE CHARGE: Performed by: ANESTHESIOLOGY

## 2023-11-30 PROCEDURE — 3700000002 HC GENERAL ANESTHESIA TIME - EACH INCREMENTAL 1 MINUTE: Performed by: ANESTHESIOLOGY

## 2023-11-30 PROCEDURE — 3600000007 HC OR TIME - EACH INCREMENTAL 1 MINUTE - PROCEDURE LEVEL TWO: Performed by: ANESTHESIOLOGY

## 2023-11-30 PROCEDURE — 94760 N-INVAS EAR/PLS OXIMETRY 1: CPT

## 2023-11-30 PROCEDURE — 88305 TISSUE EXAM BY PATHOLOGIST: CPT | Performed by: PATHOLOGY

## 2023-11-30 PROCEDURE — 2500000004 HC RX 250 GENERAL PHARMACY W/ HCPCS (ALT 636 FOR OP/ED): Performed by: ANESTHESIOLOGIST ASSISTANT

## 2023-11-30 PROCEDURE — 7100000010 HC PHASE TWO TIME - EACH INCREMENTAL 1 MINUTE: Performed by: ANESTHESIOLOGY

## 2023-11-30 RX ORDER — SODIUM CHLORIDE, SODIUM LACTATE, POTASSIUM CHLORIDE, CALCIUM CHLORIDE 600; 310; 30; 20 MG/100ML; MG/100ML; MG/100ML; MG/100ML
100 INJECTION, SOLUTION INTRAVENOUS CONTINUOUS
OUTPATIENT
Start: 2023-11-30

## 2023-11-30 RX ORDER — ONDANSETRON HYDROCHLORIDE 2 MG/ML
4 INJECTION, SOLUTION INTRAVENOUS ONCE AS NEEDED
OUTPATIENT
Start: 2023-11-30

## 2023-11-30 RX ORDER — PROPOFOL 10 MG/ML
INJECTION, EMULSION INTRAVENOUS CONTINUOUS PRN
Status: DISCONTINUED | OUTPATIENT
Start: 2023-11-30 | End: 2023-11-30

## 2023-11-30 RX ORDER — LABETALOL HYDROCHLORIDE 5 MG/ML
5 INJECTION, SOLUTION INTRAVENOUS ONCE AS NEEDED
OUTPATIENT
Start: 2023-11-30

## 2023-11-30 RX ORDER — ALBUTEROL SULFATE 0.83 MG/ML
2.5 SOLUTION RESPIRATORY (INHALATION) ONCE AS NEEDED
OUTPATIENT
Start: 2023-11-30

## 2023-11-30 RX ORDER — LIDOCAINE HYDROCHLORIDE 20 MG/ML
INJECTION, SOLUTION EPIDURAL; INFILTRATION; INTRACAUDAL; PERINEURAL AS NEEDED
Status: DISCONTINUED | OUTPATIENT
Start: 2023-11-30 | End: 2023-11-30

## 2023-11-30 RX ADMIN — PROPOFOL 400 MCG/KG/MIN: 10 INJECTION, EMULSION INTRAVENOUS at 11:51

## 2023-11-30 RX ADMIN — LIDOCAINE HYDROCHLORIDE 100 MG: 20 INJECTION, SOLUTION EPIDURAL; INFILTRATION; INTRACAUDAL; PERINEURAL at 11:51

## 2023-11-30 RX ADMIN — SODIUM CHLORIDE, SODIUM LACTATE, POTASSIUM CHLORIDE, AND CALCIUM CHLORIDE: .6; .31; .03; .02 INJECTION, SOLUTION INTRAVENOUS at 11:41

## 2023-11-30 ASSESSMENT — ENCOUNTER SYMPTOMS
COUGH: 0
SHORTNESS OF BREATH: 0
RESPIRATORY NEGATIVE: 1
ABDOMINAL PAIN: 0
FATIGUE: 1
SEIZURES: 0
SPEECH DIFFICULTY: 0
APPETITE CHANGE: 0

## 2023-11-30 ASSESSMENT — PAIN - FUNCTIONAL ASSESSMENT
PAIN_FUNCTIONAL_ASSESSMENT: 0-10

## 2023-11-30 ASSESSMENT — PAIN SCALES - GENERAL
PAINLEVEL_OUTOF10: 0 - NO PAIN

## 2023-11-30 NOTE — ANESTHESIA POSTPROCEDURE EVALUATION
Patient: Bob Smith    Procedure Summary       Date: 11/30/23 Room / Location: [facility] OR    Anesthesia Start: 1148 Anesthesia Stop: 1222    Procedure: COLONOSCOPY Diagnosis:       Encounter for screening for malignant neoplasm of colon      Benign neoplasm of colon, unspecified    Scheduled Providers: Mark Mead MD Responsible Provider: Jenna Elizabeth DO    Anesthesia Type: MAC ASA Status: 2            Anesthesia Type: MAC    Vitals Value Taken Time   /75 11/30/23 1235   Temp 36 °C (96.8 °F) 11/30/23 1221   Pulse 65 11/30/23 1235   Resp 16 11/30/23 1235   SpO2 100 % 11/30/23 1235       Anesthesia Post Evaluation    Patient location during evaluation: PACU  Patient participation: complete - patient participated  Level of consciousness: awake  Pain management: satisfactory to patient  Multimodal analgesia pain management approach  Airway patency: patent  Cardiovascular status: acceptable  Respiratory status: acceptable  Hydration status: acceptable  Postoperative Nausea and Vomiting: none    There were no known notable events for this encounter.

## 2023-11-30 NOTE — H&P
History Of Present Illness  Bob Smith is a 63 y.o. male presenting with screening colonoscopy.     Past Medical History  Past Medical History:   Diagnosis Date    Cerumen impaction 04/18/2011    Effusion, left knee 09/12/2017    Effusion of left knee    Encounter for antibody response examination 08/22/2014    Immunity status testing    Epigastric pain 10/16/2018    Dyspepsia    Impacted cerumen, bilateral 08/01/2013    Impacted cerumen of both ears    Personal history of diseases of the skin and subcutaneous tissue 09/01/2016    History of folliculitis    Personal history of other diseases of male genital organs 09/02/2016    History of epididymitis    Personal history of other diseases of the digestive system 08/02/2013    History of hemorrhoids    Personal history of other diseases of the digestive system 04/04/2022    History of gastroenteritis    Personal history of other diseases of the respiratory system 10/13/2020    History of allergic rhinitis    Personal history of other endocrine, nutritional and metabolic disease 01/22/2013    History of endocrine disorder    Personal history of other specified conditions 12/18/2015    History of fatigue    Personal history of other specified conditions 08/27/2015    History of insomnia    Polyp of colon 09/02/2016    Benign colonic polyp    Strain of muscle, fascia and tendon of lower back, initial encounter 08/01/2013    Lumbar strain    Thoracic aortic ectasia (CMS/HCC) 09/22/2016    Dilated aortic root    Unspecified abdominal pain 06/29/2017    Abdominal pain in male    Unspecified visual loss 08/22/2014    Vision problems       Surgical History  Past Surgical History:   Procedure Laterality Date    APPENDECTOMY  08/01/2013    Appendectomy    COLONOSCOPY  08/01/2013    Complete Colonoscopy    EYE SURGERY  08/01/2013    Eye Surgery    OTHER SURGICAL HISTORY  10/16/2018    Mitral Valve Repair    VASECTOMY  08/28/2015    Surgery Vas Deferens Vasectomy       "  Social History  He reports that he has never smoked. He has never been exposed to tobacco smoke. He has never used smokeless tobacco. He reports current alcohol use of about 5.0 standard drinks of alcohol per week. He reports that he does not use drugs.    Family History  Family History   Problem Relation Name Age of Onset    Heart attack Mother      Other (Cardiac disorder) Mother      Ulcers Father p     Other (Cardiac Disorder) Father p         Allergies  Patient has no known allergies.    Review of Systems   Constitutional:  Positive for fatigue. Negative for appetite change.   HENT: Negative.     Respiratory: Negative.  Negative for cough and shortness of breath.    Gastrointestinal:  Negative for abdominal pain.   Endocrine: Negative for cold intolerance.   Skin: Negative.    Neurological:  Negative for seizures and speech difficulty.        Physical Exam     Last Recorded Vitals  Blood pressure 137/88, pulse 80, temperature 36 °C (96.8 °F), temperature source Temporal, resp. rate 16, height 1.88 m (6' 2\"), weight 71.6 kg (157 lb 13.6 oz), SpO2 100 %.    Relevant Results             Assessment/Plan   Proceeed with colonoscopy  Mark Mead MD    "

## 2023-11-30 NOTE — ANESTHESIA PREPROCEDURE EVALUATION
Patient: Bob Smith    Procedure Information       Date/Time: 11/30/23 1200    Scheduled providers: Mark Mead MD    Procedure: COLONOSCOPY    Location: [facility] OR            Relevant Problems   Cardiovascular   (+) Mitral valve disease      GI   (+) GERD without esophagitis   (+) Irritable bowel syndrome      /Renal   (+) Renal cyst, acquired      Neuro/Psych   (+) Neuritis of left foot      Musculoskeletal   (+) Osteoarthritis of lumbar spine   (+) Primary osteoarthritis of left knee      Eyes, Ears, Nose, and Throat   (+) Open-angle glaucoma of right eye      Infectious Disease   (+) Paronychia of toenail   (+) Tinea corporis   (+) Tinea cruris       Clinical information reviewed:   Tobacco  Allergies  Meds   Med Hx  Surg Hx   Fam Hx  Soc Hx        NPO Detail:  NPO/Void Status  Date of Last Liquid: 11/29/23  Time of Last Liquid: 0600  Date of Last Solid: 11/29/23  Time of Last Solid: 1800  Time of Last Void: 1003         Physical Exam    Airway  Mallampati: I  TM distance: >3 FB  Neck ROM: full     Cardiovascular   Rhythm: regular  Rate: normal     Dental    Pulmonary    Abdominal      Other findings: Denies looses, chipped, cracked teeth          Anesthesia Plan    ASA 2     MAC   (NPO>MN, prior anesthesia without problems, MAC anesthesia explained, pt agrees to proceed)  Anesthetic plan and risks discussed with patient.    Plan discussed with CAA and attending.

## 2023-12-01 ASSESSMENT — PAIN SCALES - GENERAL: PAINLEVEL_OUTOF10: 0 - NO PAIN

## 2023-12-14 LAB
LABORATORY COMMENT REPORT: NORMAL
PATH REPORT.FINAL DX SPEC: NORMAL
PATH REPORT.GROSS SPEC: NORMAL
PATH REPORT.TOTAL CANCER: NORMAL

## 2024-01-15 DIAGNOSIS — G47.00 INSOMNIA, UNSPECIFIED TYPE: Chronic | ICD-10-CM

## 2024-01-15 RX ORDER — ZOLPIDEM TARTRATE 5 MG/1
5 TABLET ORAL NIGHTLY PRN
Qty: 90 TABLET | Refills: 0 | Status: SHIPPED | OUTPATIENT
Start: 2024-01-15 | End: 2024-04-17 | Stop reason: SDUPTHER

## 2024-04-17 DIAGNOSIS — G47.00 INSOMNIA, UNSPECIFIED TYPE: Chronic | ICD-10-CM

## 2024-04-18 RX ORDER — ZOLPIDEM TARTRATE 5 MG/1
5 TABLET ORAL NIGHTLY PRN
Qty: 90 TABLET | Refills: 0 | Status: SHIPPED | OUTPATIENT
Start: 2024-04-18 | End: 2024-08-16

## 2024-04-21 DIAGNOSIS — E29.1 HYPOGONADISM MALE: ICD-10-CM

## 2024-04-21 DIAGNOSIS — R73.01 ELEVATED FASTING GLUCOSE: ICD-10-CM

## 2024-04-21 DIAGNOSIS — E78.5 DYSLIPIDEMIA, GOAL LDL BELOW 100: ICD-10-CM

## 2024-04-21 DIAGNOSIS — E55.9 VITAMIN D DEFICIENCY: Primary | ICD-10-CM

## 2024-04-26 ENCOUNTER — LAB (OUTPATIENT)
Dept: LAB | Facility: LAB | Age: 64
End: 2024-04-26
Payer: COMMERCIAL

## 2024-04-26 DIAGNOSIS — R73.01 ELEVATED FASTING GLUCOSE: ICD-10-CM

## 2024-04-26 DIAGNOSIS — E29.1 HYPOGONADISM MALE: ICD-10-CM

## 2024-04-26 DIAGNOSIS — E78.5 DYSLIPIDEMIA, GOAL LDL BELOW 100: ICD-10-CM

## 2024-04-26 DIAGNOSIS — E55.9 VITAMIN D DEFICIENCY: ICD-10-CM

## 2024-04-26 LAB
25(OH)D3 SERPL-MCNC: 31 NG/ML (ref 30–100)
ALBUMIN SERPL BCP-MCNC: 4.3 G/DL (ref 3.4–5)
ALP SERPL-CCNC: 58 U/L (ref 33–136)
ALT SERPL W P-5'-P-CCNC: 20 U/L (ref 10–52)
ANION GAP SERPL CALC-SCNC: 14 MMOL/L (ref 10–20)
AST SERPL W P-5'-P-CCNC: 20 U/L (ref 9–39)
BILIRUB SERPL-MCNC: 1.1 MG/DL (ref 0–1.2)
BUN SERPL-MCNC: 28 MG/DL (ref 6–23)
CALCIUM SERPL-MCNC: 9.5 MG/DL (ref 8.6–10.6)
CHLORIDE SERPL-SCNC: 101 MMOL/L (ref 98–107)
CHOLEST SERPL-MCNC: 207 MG/DL (ref 0–199)
CHOLESTEROL/HDL RATIO: 2.3
CO2 SERPL-SCNC: 28 MMOL/L (ref 21–32)
CREAT SERPL-MCNC: 1.02 MG/DL (ref 0.5–1.3)
EGFRCR SERPLBLD CKD-EPI 2021: 82 ML/MIN/1.73M*2
ERYTHROCYTE [DISTWIDTH] IN BLOOD BY AUTOMATED COUNT: 14.2 % (ref 11.5–14.5)
EST. AVERAGE GLUCOSE BLD GHB EST-MCNC: 105 MG/DL
GLUCOSE SERPL-MCNC: 90 MG/DL (ref 74–99)
HBA1C MFR BLD: 5.3 %
HCT VFR BLD AUTO: 42.5 % (ref 41–52)
HDLC SERPL-MCNC: 88.8 MG/DL
HGB BLD-MCNC: 14 G/DL (ref 13.5–17.5)
LDLC SERPL CALC-MCNC: 105 MG/DL
MCH RBC QN AUTO: 32.4 PG (ref 26–34)
MCHC RBC AUTO-ENTMCNC: 32.9 G/DL (ref 32–36)
MCV RBC AUTO: 98 FL (ref 80–100)
NON HDL CHOLESTEROL: 118 MG/DL (ref 0–149)
NRBC BLD-RTO: 0 /100 WBCS (ref 0–0)
PLATELET # BLD AUTO: 292 X10*3/UL (ref 150–450)
POTASSIUM SERPL-SCNC: 4.5 MMOL/L (ref 3.5–5.3)
PROT SERPL-MCNC: 7.4 G/DL (ref 6.4–8.2)
RBC # BLD AUTO: 4.32 X10*6/UL (ref 4.5–5.9)
SODIUM SERPL-SCNC: 138 MMOL/L (ref 136–145)
TRIGL SERPL-MCNC: 66 MG/DL (ref 0–149)
TSH SERPL-ACNC: 2.36 MIU/L (ref 0.44–3.98)
VLDL: 13 MG/DL (ref 0–40)
WBC # BLD AUTO: 6 X10*3/UL (ref 4.4–11.3)

## 2024-04-26 PROCEDURE — 82306 VITAMIN D 25 HYDROXY: CPT

## 2024-04-26 PROCEDURE — 85027 COMPLETE CBC AUTOMATED: CPT

## 2024-04-26 PROCEDURE — 84443 ASSAY THYROID STIM HORMONE: CPT

## 2024-04-26 PROCEDURE — 80053 COMPREHEN METABOLIC PANEL: CPT

## 2024-04-26 PROCEDURE — 36415 COLL VENOUS BLD VENIPUNCTURE: CPT

## 2024-04-26 PROCEDURE — 84402 ASSAY OF FREE TESTOSTERONE: CPT

## 2024-04-26 PROCEDURE — 80061 LIPID PANEL: CPT

## 2024-04-26 PROCEDURE — 83036 HEMOGLOBIN GLYCOSYLATED A1C: CPT

## 2024-04-27 NOTE — RESULT ENCOUNTER NOTE
Results reviewed. No urgent findings.  Will Review results in detail at upcoming office appointment scheduled soon.      Mundo Browning MD

## 2024-04-30 LAB
TESTOSTERONE FREE (CHAN): 58.4 PG/ML (ref 35–155)
TESTOSTERONE,TOTAL,LC-MS/MS: 460 NG/DL (ref 250–1100)

## 2024-05-06 ENCOUNTER — OFFICE VISIT (OUTPATIENT)
Dept: PRIMARY CARE | Facility: CLINIC | Age: 64
End: 2024-05-06
Payer: COMMERCIAL

## 2024-05-06 VITALS
BODY MASS INDEX: 20.79 KG/M2 | SYSTOLIC BLOOD PRESSURE: 116 MMHG | HEIGHT: 74 IN | OXYGEN SATURATION: 99 % | DIASTOLIC BLOOD PRESSURE: 82 MMHG | HEART RATE: 75 BPM | WEIGHT: 162 LBS | TEMPERATURE: 97.2 F

## 2024-05-06 DIAGNOSIS — I05.9 MITRAL VALVE DISEASE: ICD-10-CM

## 2024-05-06 DIAGNOSIS — K21.9 GERD WITHOUT ESOPHAGITIS: ICD-10-CM

## 2024-05-06 DIAGNOSIS — Z23 IMMUNIZATION DUE: ICD-10-CM

## 2024-05-06 DIAGNOSIS — H40.10X0 OPEN-ANGLE GLAUCOMA OF RIGHT EYE, UNSPECIFIED GLAUCOMA STAGE, UNSPECIFIED OPEN-ANGLE GLAUCOMA TYPE: Primary | ICD-10-CM

## 2024-05-06 DIAGNOSIS — D12.6 TUBULAR ADENOMA OF COLON: ICD-10-CM

## 2024-05-06 DIAGNOSIS — E55.9 VITAMIN D DEFICIENCY: ICD-10-CM

## 2024-05-06 DIAGNOSIS — N40.1 BENIGN PROSTATIC HYPERPLASIA WITH NOCTURIA: Chronic | ICD-10-CM

## 2024-05-06 DIAGNOSIS — K58.9 IRRITABLE BOWEL SYNDROME, UNSPECIFIED TYPE: ICD-10-CM

## 2024-05-06 DIAGNOSIS — R35.1 BENIGN PROSTATIC HYPERPLASIA WITH NOCTURIA: Chronic | ICD-10-CM

## 2024-05-06 DIAGNOSIS — Z97.3 WEARS GLASSES: ICD-10-CM

## 2024-05-06 DIAGNOSIS — Z97.3 WEARS CONTACT LENSES: ICD-10-CM

## 2024-05-06 PROCEDURE — 1036F TOBACCO NON-USER: CPT | Performed by: INTERNAL MEDICINE

## 2024-05-06 PROCEDURE — 99396 PREV VISIT EST AGE 40-64: CPT | Performed by: INTERNAL MEDICINE

## 2024-05-06 ASSESSMENT — ENCOUNTER SYMPTOMS
LOSS OF SENSATION IN FEET: 0
DEPRESSION: 0
OCCASIONAL FEELINGS OF UNSTEADINESS: 0

## 2024-05-06 ASSESSMENT — PATIENT HEALTH QUESTIONNAIRE - PHQ9
SUM OF ALL RESPONSES TO PHQ9 QUESTIONS 1 AND 2: 0
1. LITTLE INTEREST OR PLEASURE IN DOING THINGS: NOT AT ALL
2. FEELING DOWN, DEPRESSED OR HOPELESS: NOT AT ALL

## 2024-05-06 NOTE — PROGRESS NOTES
"Subjective   Patient ID: Bob Smith is a 64 y.o. male who presents for Follow-up.    Here for follow-up and wellness visit  Doing well without illnesses or injuries  No exertional chest pain, palpitations, dizziness, orthopnea or pedal edema.  Scheduled to see coincidentally his cardiologist tomorrow for his annual visit and echocardiogram         Review of Systems    Objective   /82   Pulse 75   Temp 36.2 °C (97.2 °F)   Ht 1.88 m (6' 2\")   Wt 73.5 kg (162 lb)   SpO2 99%   BMI 20.80 kg/m²     Physical Exam  Constitutional:       Appearance: Normal appearance.   HENT:      Head: Normocephalic and atraumatic.      Right Ear: Tympanic membrane normal.      Nose: Nose normal.   Eyes:      General: No scleral icterus.     Extraocular Movements: Extraocular movements intact.      Conjunctiva/sclera: Conjunctivae normal.      Pupils: Pupils are equal, round, and reactive to light.   Cardiovascular:      Rate and Rhythm: Normal rate and regular rhythm.      Pulses: Normal pulses.      Heart sounds: Murmur heard.   Pulmonary:      Effort: Pulmonary effort is normal. No respiratory distress.      Breath sounds: Normal breath sounds. No stridor. No wheezing.   Abdominal:      General: Abdomen is flat. Bowel sounds are normal. There is no distension.      Palpations: Abdomen is soft. There is no mass.      Tenderness: There is no abdominal tenderness. There is no guarding.   Musculoskeletal:         General: No swelling, tenderness or deformity. Normal range of motion.      Cervical back: Normal range of motion and neck supple. No tenderness.   Lymphadenopathy:      Cervical: No cervical adenopathy.   Skin:     General: Skin is warm and dry.      Findings: No lesion or rash.   Neurological:      General: No focal deficit present.      Mental Status: He is alert and oriented to person, place, and time.      Cranial Nerves: No cranial nerve deficit.      Motor: No weakness.   Psychiatric:         Mood and Affect: " Mood normal.         Behavior: Behavior normal.         Thought Content: Thought content normal.         Judgment: Judgment normal.         Assessment/Plan   Problem List Items Addressed This Visit             ICD-10-CM    Wears glasses Z97.3    Wears contact lenses Z97.3    Vitamin D deficiency E55.9    Tubular adenoma of colon D12.6    GERD without esophagitis K21.9    Benign enlargement of prostate (Chronic) N40.0    Mitral valve disease I05.9    Open-angle glaucoma of right eye - Primary H40.10X0    Irritable bowel syndrome K58.9     Other Visit Diagnoses         Codes    Immunization due     Z23    Relevant Medications    respiratory syncytial virus, RSV, vaccine, adjuvanted, age 60y+ (Arexvy) 120 mcg/0.5 mL suspension for reconstitution               Sleep Aids:   What is the patient's goal of therapy? To help w/ sleep  Is this being achieved with current treatment? yes    Activities of Daily Living:   Is your overall impression that this patient is benefiting (symptom reduction outweighs side effects) from sleep aid therapy? Yes     1. Physical Functioning: Better  2. Family Relationship: Better  3. Social Relationship: Better  4. Mood: Better  5. Sleep Patterns: Better  6. Overall Function: Better      CSA:  10/25/23  PDMP: 5/6/24  UDS:   10/6/23         Hx  slightly dilated aortic arch history of mitral valve repair 2007 -        he saw his cardiologist, Dr. Montes for an echo in the spring       Coincidentally he will see him tomorrow 5/7/2024, and his daughter will do the echocardiogram    exercise routine-he continues to work out regularly, most recently with walking-60 to 90 minutes 5 days weekly in the [place].  He also does stretching           He continues to remain quite active with stretching     Dyslipidemia- 10 year cardiac risk favorable late 2016. We'll follow. Annual spring lipids 4/22 show LDL at 100, but HDL a bit worse than last time.  Goal LDL under 100  10/23-LDL 97   5/24;       Elevated PSA velocity-negative biopsy Jan '21 per Dr. Snyder. He saw him back in July 2021- PSA had decreased-he recommended following this every 6 months-ordered   PSA up again in 4/22. He'll see Dr. Snyder in several weeks as scheduled. I told him I am concerned about testosterone replacement therapy in a 60 year-old. He will specifically asked her Dr. Snyder if this testosterone therapy is still a safe option for him.               10/23-he discontinued testosterone.  PSA stable 10/23     Hx hypogonadism/BPH/long-standing infrequent epididymitis/erectile dysfunction-though he was on testosterone for years-this was discontinued late 2022     Benign appearing Left renal cyst-unremarkable renal ultrasound late 2016. He notes in 2021- at [facility] another renal ultrasound was done after his cardiology visit and his echo showed a concern.     History of Right lateral lumbar pain/lumbar arthritis-caution with lifting standing and positioning and his chores around the house and follow-up with concerns. not an active issue now. He understands importance of consistent stretching             No issues with his back presently        Acid reflux-stable with medication. He will try to use this as needed. No swallowing difficulties. He notes this is almost over seasonal issue. uses Pepcid AC as needed, typically seasonally     Fallen arches -he has had metatarsalgia before as well as toe tingling weightbearing. Encouraged optimal fitting shoes with optimal arch support. Presently less symptomatic. Have encouraged podiatry follow-up in the past with concerns   Much improved with inserts from [employer] shoe store in [place]- he goes out every 12-18 months for new inserts    Toenail concern - he did see a podiatrist to trim his 1st right toenail. He'll return as needed.     Plantar wart-in the past, noted fifth MTP area suggested soaking and pumice stone. Encouraged podiatry for ingrown toenail      Right shoulder soreness - he saw [facility] summer '20. X-ray OK. improved     Left knee DJD/history of knee effusion-not problematic. He will see orthopedics as needed, following injection per Dr. Corral in 2017. Knee better since he gave up running. walks 1 hr 7 days/wk    Irritable bowel symptoms-improved following recommendation from gastro practitioner-he is on IBgard which helps      Colon polyps / history of external hemorrhoids-            Colonoscopy late November 2023- 6 polyps removed.           Next colonoscopy 5 yrs - Nov '28    Vitamin D deficiency- encouraged patient to continue vitamin D daily as ordered. Will consider vitamin D level regularly.             He is off extra vitamin D because of dyspepsia.  He will try a multivitamin with his 10/23 vitamin D borderline low.        Psoriasis /seborrhea-  RE Skin care- Sees the NP in dermatology annually- [facility]        Groin psoriasis main issue, late fall & winter.  Topicals remain helpful          He'll set up appt as it's been some time since last one.     Cerumen concern-he prefers to follow with his ENT doctor, Dr. Raymond semiannually.                10/23-no cerumen on exam.  He will follow-up with [facility] with concerns.     Dental care-encouraged biannually. He goes 3 times yearly; UTD.     Glasses / contacts / Vision care-he will continue with Dr. Hickman - optometrist annually-            Uses glasses as well as contacts 3 x wk. Visits UTD        open-angle glaucoma-right eye-he now is on eyedrops under the direction of Dr. Elvis Cohen            Next appt mid May '24     Blepharitis- ongoing for approximately 4 weeks. Encouraged using a warm compress. He actually describes what may be a right lower eyelid hordeolum. Resolved. Again he will follow-up with his eye doctor with concerns        half-way / part time job-- retired at the end of January 2021. 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 booster shot 12/23    Discussed RSV vaccination.  He may consider getting this though his wife had a reaction to the vaccination     Shingrix series updated 2019    RSV vacc - encouraged 5/24     Follow-up every 6 months, sooner as needed     Charting was completed using voice recognition technology and may include unintended errors.

## 2024-08-09 ENCOUNTER — TELEPHONE (OUTPATIENT)
Dept: PRIMARY CARE | Facility: CLINIC | Age: 64
End: 2024-08-09
Payer: COMMERCIAL

## 2024-08-09 DIAGNOSIS — G47.00 INSOMNIA, UNSPECIFIED TYPE: Chronic | ICD-10-CM

## 2024-08-11 RX ORDER — ZOLPIDEM TARTRATE 5 MG/1
5 TABLET ORAL NIGHTLY PRN
Qty: 90 TABLET | Refills: 0 | Status: SHIPPED | OUTPATIENT
Start: 2024-08-11

## 2024-10-12 DIAGNOSIS — Z79.899 MEDICATION MANAGEMENT: Primary | ICD-10-CM

## 2024-10-12 DIAGNOSIS — R35.1 BENIGN PROSTATIC HYPERPLASIA WITH NOCTURIA: ICD-10-CM

## 2024-10-12 DIAGNOSIS — E29.1 HYPOGONADISM MALE: ICD-10-CM

## 2024-10-12 DIAGNOSIS — N40.1 BENIGN PROSTATIC HYPERPLASIA WITH NOCTURIA: ICD-10-CM

## 2024-10-12 DIAGNOSIS — Z79.890 ENCOUNTER FOR MONITORING TESTOSTERONE REPLACEMENT THERAPY: ICD-10-CM

## 2024-10-12 DIAGNOSIS — Z51.81 ENCOUNTER FOR MONITORING TESTOSTERONE REPLACEMENT THERAPY: ICD-10-CM

## 2024-10-19 ENCOUNTER — LAB (OUTPATIENT)
Dept: LAB | Facility: LAB | Age: 64
End: 2024-10-19
Payer: COMMERCIAL

## 2024-10-19 DIAGNOSIS — Z79.899 MEDICATION MANAGEMENT: ICD-10-CM

## 2024-10-19 DIAGNOSIS — Z51.81 ENCOUNTER FOR MONITORING TESTOSTERONE REPLACEMENT THERAPY: ICD-10-CM

## 2024-10-19 DIAGNOSIS — Z79.890 ENCOUNTER FOR MONITORING TESTOSTERONE REPLACEMENT THERAPY: ICD-10-CM

## 2024-10-19 DIAGNOSIS — E29.1 HYPOGONADISM MALE: ICD-10-CM

## 2024-10-19 LAB
ALBUMIN SERPL BCP-MCNC: 4.5 G/DL (ref 3.4–5)
ALP SERPL-CCNC: 68 U/L (ref 33–136)
ALT SERPL W P-5'-P-CCNC: 17 U/L (ref 10–52)
AMPHETAMINES UR QL SCN: NORMAL
AST SERPL W P-5'-P-CCNC: 20 U/L (ref 9–39)
BARBITURATES UR QL SCN: NORMAL
BENZODIAZ UR QL SCN: NORMAL
BILIRUB DIRECT SERPL-MCNC: 0.1 MG/DL (ref 0–0.3)
BILIRUB SERPL-MCNC: 0.8 MG/DL (ref 0–1.2)
BZE UR QL SCN: NORMAL
CANNABINOIDS UR QL SCN: NORMAL
ERYTHROCYTE [DISTWIDTH] IN BLOOD BY AUTOMATED COUNT: 15.7 % (ref 11.5–14.5)
FENTANYL+NORFENTANYL UR QL SCN: NORMAL
HCT VFR BLD AUTO: 42.3 % (ref 41–52)
HGB BLD-MCNC: 13.7 G/DL (ref 13.5–17.5)
MCH RBC QN AUTO: 32.2 PG (ref 26–34)
MCHC RBC AUTO-ENTMCNC: 32.4 G/DL (ref 32–36)
MCV RBC AUTO: 100 FL (ref 80–100)
METHADONE UR QL SCN: NORMAL
NRBC BLD-RTO: 0 /100 WBCS (ref 0–0)
OPIATES UR QL SCN: NORMAL
OXYCODONE+OXYMORPHONE UR QL SCN: NORMAL
PCP UR QL SCN: NORMAL
PLATELET # BLD AUTO: 265 X10*3/UL (ref 150–450)
PROT SERPL-MCNC: 7.1 G/DL (ref 6.4–8.2)
PSA SERPL-MCNC: 3.42 NG/ML
RBC # BLD AUTO: 4.25 X10*6/UL (ref 4.5–5.9)
WBC # BLD AUTO: 8 X10*3/UL (ref 4.4–11.3)

## 2024-10-19 PROCEDURE — 84402 ASSAY OF FREE TESTOSTERONE: CPT

## 2024-10-19 PROCEDURE — 85027 COMPLETE CBC AUTOMATED: CPT

## 2024-10-19 PROCEDURE — 36415 COLL VENOUS BLD VENIPUNCTURE: CPT

## 2024-10-19 PROCEDURE — 80307 DRUG TEST PRSMV CHEM ANLYZR: CPT

## 2024-10-19 PROCEDURE — 80076 HEPATIC FUNCTION PANEL: CPT

## 2024-10-19 PROCEDURE — 84153 ASSAY OF PSA TOTAL: CPT

## 2024-10-25 LAB
TESTOSTERONE FREE (CHAN): 48.6 PG/ML (ref 35–155)
TESTOSTERONE,TOTAL,LC-MS/MS: 357 NG/DL (ref 250–1100)

## 2024-11-11 ENCOUNTER — APPOINTMENT (OUTPATIENT)
Dept: PRIMARY CARE | Facility: CLINIC | Age: 64
End: 2024-11-11
Payer: COMMERCIAL

## 2024-11-11 VITALS
HEIGHT: 74 IN | DIASTOLIC BLOOD PRESSURE: 82 MMHG | SYSTOLIC BLOOD PRESSURE: 118 MMHG | OXYGEN SATURATION: 96 % | WEIGHT: 161 LBS | BODY MASS INDEX: 20.66 KG/M2 | HEART RATE: 68 BPM | TEMPERATURE: 97.5 F

## 2024-11-11 DIAGNOSIS — H40.10X0 OPEN-ANGLE GLAUCOMA OF RIGHT EYE, UNSPECIFIED GLAUCOMA STAGE, UNSPECIFIED OPEN-ANGLE GLAUCOMA TYPE: ICD-10-CM

## 2024-11-11 DIAGNOSIS — N40.1 BENIGN PROSTATIC HYPERPLASIA WITH NOCTURIA: ICD-10-CM

## 2024-11-11 DIAGNOSIS — D12.6 TUBULAR ADENOMA OF COLON: ICD-10-CM

## 2024-11-11 DIAGNOSIS — E78.5 DYSLIPIDEMIA, GOAL LDL BELOW 100: ICD-10-CM

## 2024-11-11 DIAGNOSIS — K21.9 GERD WITHOUT ESOPHAGITIS: ICD-10-CM

## 2024-11-11 DIAGNOSIS — R35.1 BENIGN PROSTATIC HYPERPLASIA WITH NOCTURIA: ICD-10-CM

## 2024-11-11 DIAGNOSIS — E55.9 VITAMIN D DEFICIENCY: ICD-10-CM

## 2024-11-11 DIAGNOSIS — Z00.00 ANNUAL PHYSICAL EXAM: Primary | ICD-10-CM

## 2024-11-11 PROCEDURE — 99396 PREV VISIT EST AGE 40-64: CPT | Performed by: INTERNAL MEDICINE

## 2024-11-11 PROCEDURE — 3008F BODY MASS INDEX DOCD: CPT | Performed by: INTERNAL MEDICINE

## 2024-11-11 NOTE — PROGRESS NOTES
"Subjective   Patient ID: Bob Smith is a 64 y.o. male who presents for Annual Exam.    Here for annual wellness visit  Doing well  No illnesses or injuries  Remains active working in [employer] typically 2 days weekly.         Review of Systems    Objective   /82 (BP Location: Right arm, Patient Position: Sitting, BP Cuff Size: Adult)   Pulse 68   Temp 36.4 °C (97.5 °F) (Temporal)   Ht 1.867 m (6' 1.5\")   Wt 73 kg (161 lb)   SpO2 96%   BMI 20.95 kg/m²     Physical Exam  Constitutional:       Appearance: Normal appearance.   HENT:      Head: Normocephalic and atraumatic.      Right Ear: Tympanic membrane normal.      Left Ear: Tympanic membrane normal.      Nose: Nose normal.   Eyes:      General: No scleral icterus.     Extraocular Movements: Extraocular movements intact.      Conjunctiva/sclera: Conjunctivae normal.      Pupils: Pupils are equal, round, and reactive to light.   Cardiovascular:      Rate and Rhythm: Normal rate and regular rhythm.      Pulses: Normal pulses.      Heart sounds: Murmur heard.   Pulmonary:      Effort: Pulmonary effort is normal. No respiratory distress.      Breath sounds: Normal breath sounds. No stridor. No wheezing.   Abdominal:      General: Abdomen is flat. Bowel sounds are normal. There is no distension.      Palpations: Abdomen is soft. There is no mass.      Tenderness: There is no abdominal tenderness. There is no guarding.   Musculoskeletal:         General: No swelling, tenderness or deformity. Normal range of motion.      Cervical back: Normal range of motion and neck supple. No tenderness.   Lymphadenopathy:      Cervical: No cervical adenopathy.   Skin:     General: Skin is warm and dry.      Findings: No lesion or rash.   Neurological:      General: No focal deficit present.      Mental Status: He is alert and oriented to person, place, and time.      Cranial Nerves: No cranial nerve deficit.      Motor: No weakness.   Psychiatric:         Mood and " Affect: Mood normal.         Behavior: Behavior normal.         Thought Content: Thought content normal.         Judgment: Judgment normal.         Assessment/Plan   Problem List Items Addressed This Visit             ICD-10-CM    Vitamin D deficiency E55.9    Tubular adenoma of colon D12.6    GERD without esophagitis K21.9    Benign enlargement of prostate (Chronic) N40.0    Relevant Orders    Prostate Specific Antigen    Open-angle glaucoma of right eye H40.10X0     Other Visit Diagnoses         Codes    Annual physical exam    -  Primary Z00.00    Dyslipidemia, goal LDL below 100     E78.5    Relevant Orders    CBC    Comprehensive Metabolic Panel    TSH with reflex to Free T4 if abnormal    Lipid Panel                 Portions of this encounter note have been copied from my previous note dated 5/6/24  , which have been updated where appropriate and all reflect my current medical decision making from today.       Labs from 10/19/24 rev'd     Sleep Aids:   What is the patient's goal of therapy? To help w/ sleep  Is this being achieved with current treatment? yes    Activities of Daily Living:   Is your overall impression that this patient is benefiting (symptom reduction outweighs side effects) from sleep aid therapy? Yes     1. Physical Functioning: Better  2. Family Relationship: Better  3. Social Relationship: Better  4. Mood: Better  5. Sleep Patterns: Better  6. Overall Function: Better      CSA:  11/11/24  PDMP: 11/11/24  UDS:   10/19/24         Hx  slightly dilated aortic arch history of mitral valve repair 2007 -        he saw his cardiologist, Dr. Montes for an echo in the spring       Coincidentally he will see him tomorrow 5/7/2024, and his daughter will do the echocardiogram            He saw Dr. Montes, and did echo 5/24, next appt late Nov '24 for semiannual appt.      Dyslipidemia- 10 year cardiac risk favorable late 2016. We'll follow. Annual spring lipids 4/22 show LDL at 100, but HDL a bit worse  than last time.  Goal LDL under 100  10/23-LDL 97   5/24;   Fasting labs ordered for next time      exercise routine-he continues to work out regularly, most recently with walking-60 to 90 minutes 5 days weekly in the [place].  He also does stretching           He continues to remain quite active with stretching- walks 1-1.5 hrs, 4-5 x wk       Acid reflux-stable with medication. He will try to use this as needed. No swallowing difficulties. He notes this is almost over seasonal issue. uses Pepcid AC as needed, typically seasonally    Irritable bowel symptoms-improved following recommendation from gastro practitioner-            he is on IBgard which helps      Colon polyps / history of external hemorrhoids-            Colonoscopy late November 2023- 6 polyps removed.           Next colonoscopy 5 yrs - Nov '28       Elevated PSA velocity with BPH-negative biopsy Jan '21 per Dr. Snyder. He saw him back in July 2021- PSA had decreased-he recommended following this every 6 months-ordered   PSA up again in 4/22. He'll see Dr. Snyder in several weeks as scheduled. I told him I am concerned about testosterone replacement therapy in a 60 year-old. He will specifically asked her Dr. Snyder if this testosterone therapy is still a safe option for him.               10/23-he discontinued testosterone.  PSA stable 10/23                  11/24-PSA remains stable.                   Hx hypogonadism/ infrequent epididymitis/ erectile dysfunction-though he was on testosterone for years-this was discontinued late 2022 11/24-testosterone normal     Benign appearing Left renal cyst-unremarkable renal ultrasound late 2016. He notes in 2021- at [facility] another renal ultrasound was done after his cardiology visit and his echo showed a concern.     History of Right lateral lumbar pain/lumbar arthritis-caution with lifting standing and positioning and his chores around the house and follow-up with  concerns. not an active issue now. He understands importance of consistent stretching             No issues with his back presently           Fallen arches -he has had metatarsalgia before as well as toe tingling weightbearing. Encouraged optimal fitting shoes with optimal arch support. Presently less symptomatic. Have encouraged podiatry follow-up in the past with concerns   Much improved with inserts from [employer] shoe store in [place]- he goes out every 12-18 months for new inserts    Toenail concern - he did see a podiatrist to trim his 1st right toenail. He'll return as needed.     Plantar wart-in the past, noted fifth MTP area suggested soaking and pumice stone. Encouraged podiatry for ingrown toenail     Right shoulder soreness - he saw [facility] summer '20. X-ray OK.          Improved after stopping tennis     Left knee DJD/history of knee effusion-not problematic. He will see orthopedics as needed, following injection per Dr. Corral in 2017.          Knee better since he gave up running. Walks weekly      Vitamin D deficiency- encouraged patient to continue vitamin D daily as ordered. Will consider vitamin D level regularly.             He is off extra vitamin D because of dyspepsia.  He will try a multivitamin with his 10/23 vitamin D borderline low.        Psoriasis /seborrhea-  RE Skin care- Sees the NP in dermatology annually- [facility]        Groin psoriasis main issue, late fall & winter.  Topicals remain helpful          He'll set up appt as it's been some time since last one.         Last visit spring '23. Diet helps to keep psoriasis in check     Cerumen concern-he prefers to follow with his ENT doctor, Dr. Raymond semiannually.                10/23-no cerumen on exam.  He will [facility] with concerns.     Dental care-encouraged biannually.            He goes 3 times yearly; UTD.     Glasses / contacts / Vision care-he will continue with Dr. Hickman -  optometrist annually-            Uses progressive lenses glasses as well as  monofocal contacts 3 x wk.           Visits annually each spring - next in spring '25        open-angle glaucoma-right eye-he now is on eyedrops under the direction of Dr. Elvis Cohen            Next appt Jan '25 for semiannual appt. Using latanoprost in right eye     Blepharitis- ongoing for approximately 4 weeks. Encouraged using a warm compress. He actually describes what may be a right lower eyelid hordeolum. Resolved. 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 booster shot 9/24    RSV vaccination - 5/24     Shingrix series updated 2019    RSV vacc - encouraged 5/24     Follow-up every 6 months, sooner as needed     Charting was completed using voice recognition technology and may include unintended errors.

## 2024-11-25 DIAGNOSIS — G47.00 INSOMNIA, UNSPECIFIED TYPE: Chronic | ICD-10-CM

## 2024-11-25 RX ORDER — ZOLPIDEM TARTRATE 5 MG/1
5 TABLET ORAL NIGHTLY PRN
Qty: 90 TABLET | Refills: 0 | Status: SHIPPED | OUTPATIENT
Start: 2024-11-25

## 2025-02-24 DIAGNOSIS — G47.00 INSOMNIA, UNSPECIFIED TYPE: Chronic | ICD-10-CM

## 2025-02-24 RX ORDER — ZOLPIDEM TARTRATE 5 MG/1
5 TABLET ORAL NIGHTLY PRN
Qty: 90 TABLET | Refills: 0 | Status: SHIPPED | OUTPATIENT
Start: 2025-02-24

## 2025-05-01 LAB
ALBUMIN SERPL-MCNC: 4.2 G/DL (ref 3.6–5.1)
ALP SERPL-CCNC: 70 U/L (ref 35–144)
ALT SERPL-CCNC: 11 U/L (ref 9–46)
ANION GAP SERPL CALCULATED.4IONS-SCNC: 9 MMOL/L (CALC) (ref 7–17)
AST SERPL-CCNC: 17 U/L (ref 10–35)
BILIRUB SERPL-MCNC: 0.5 MG/DL (ref 0.2–1.2)
BUN SERPL-MCNC: 20 MG/DL (ref 7–25)
CALCIUM SERPL-MCNC: 9.3 MG/DL (ref 8.6–10.3)
CHLORIDE SERPL-SCNC: 104 MMOL/L (ref 98–110)
CHOLEST SERPL-MCNC: 172 MG/DL
CHOLEST/HDLC SERPL: 2.6 (CALC)
CO2 SERPL-SCNC: 27 MMOL/L (ref 20–32)
CREAT SERPL-MCNC: 0.95 MG/DL (ref 0.7–1.35)
EGFRCR SERPLBLD CKD-EPI 2021: 89 ML/MIN/1.73M2
ERYTHROCYTE [DISTWIDTH] IN BLOOD BY AUTOMATED COUNT: 14 % (ref 11–15)
GLUCOSE SERPL-MCNC: 110 MG/DL (ref 65–99)
HCT VFR BLD AUTO: 40.8 % (ref 38.5–50)
HDLC SERPL-MCNC: 66 MG/DL
HGB BLD-MCNC: 13.8 G/DL (ref 13.2–17.1)
LDLC SERPL CALC-MCNC: 87 MG/DL (CALC)
MCH RBC QN AUTO: 33.3 PG (ref 27–33)
MCHC RBC AUTO-ENTMCNC: 33.8 G/DL (ref 32–36)
MCV RBC AUTO: 98.6 FL (ref 80–100)
NONHDLC SERPL-MCNC: 106 MG/DL (CALC)
PLATELET # BLD AUTO: 266 THOUSAND/UL (ref 140–400)
PMV BLD REES-ECKER: 9.9 FL (ref 7.5–12.5)
POTASSIUM SERPL-SCNC: 4.9 MMOL/L (ref 3.5–5.3)
PROT SERPL-MCNC: 7 G/DL (ref 6.1–8.1)
PSA SERPL-MCNC: 3.71 NG/ML
RBC # BLD AUTO: 4.14 MILLION/UL (ref 4.2–5.8)
SODIUM SERPL-SCNC: 140 MMOL/L (ref 135–146)
TRIGL SERPL-MCNC: 98 MG/DL
TSH SERPL-ACNC: 2.13 MIU/L (ref 0.4–4.5)
WBC # BLD AUTO: 5.4 THOUSAND/UL (ref 3.8–10.8)

## 2025-05-02 NOTE — RESULT ENCOUNTER NOTE
Bob    Thanks for doing the labs.  Most of the results look stable.  Will review them at your upcoming appointment.    The fasting glucose is above 100, which is in the prediabetes range.    The prostate test is up just slightly from where it was 6 months ago.  Fortunately this still remains in the normal range.    Thanks again for doing the labs.    Sincerely,  Mundo Browning MD

## 2025-05-12 ENCOUNTER — APPOINTMENT (OUTPATIENT)
Dept: PRIMARY CARE | Facility: CLINIC | Age: 65
End: 2025-05-12
Payer: COMMERCIAL

## 2025-05-12 VITALS
HEIGHT: 74 IN | HEART RATE: 76 BPM | BODY MASS INDEX: 21.2 KG/M2 | SYSTOLIC BLOOD PRESSURE: 118 MMHG | DIASTOLIC BLOOD PRESSURE: 76 MMHG | OXYGEN SATURATION: 100 % | WEIGHT: 165.2 LBS

## 2025-05-12 DIAGNOSIS — R73.09 ELEVATED GLUCOSE: ICD-10-CM

## 2025-05-12 DIAGNOSIS — N40.1 BENIGN PROSTATIC HYPERPLASIA WITH NOCTURIA: ICD-10-CM

## 2025-05-12 DIAGNOSIS — Z98.890 H/O MITRAL VALVE REPAIR: ICD-10-CM

## 2025-05-12 DIAGNOSIS — E55.9 VITAMIN D DEFICIENCY: ICD-10-CM

## 2025-05-12 DIAGNOSIS — E78.5 DYSLIPIDEMIA, GOAL LDL BELOW 100: Primary | ICD-10-CM

## 2025-05-12 DIAGNOSIS — R35.1 BENIGN PROSTATIC HYPERPLASIA WITH NOCTURIA: ICD-10-CM

## 2025-05-12 DIAGNOSIS — L40.9 PSORIASIS: ICD-10-CM

## 2025-05-12 DIAGNOSIS — D12.6 TUBULAR ADENOMA OF COLON: ICD-10-CM

## 2025-05-12 DIAGNOSIS — Z23 IMMUNIZATION DUE: ICD-10-CM

## 2025-05-12 DIAGNOSIS — Z97.3 WEARS GLASSES: ICD-10-CM

## 2025-05-12 DIAGNOSIS — H40.10X0 OPEN-ANGLE GLAUCOMA OF RIGHT EYE, UNSPECIFIED GLAUCOMA STAGE, UNSPECIFIED OPEN-ANGLE GLAUCOMA TYPE: ICD-10-CM

## 2025-05-12 PROCEDURE — 1123F ACP DISCUSS/DSCN MKR DOCD: CPT | Performed by: INTERNAL MEDICINE

## 2025-05-12 PROCEDURE — 1036F TOBACCO NON-USER: CPT | Performed by: INTERNAL MEDICINE

## 2025-05-12 PROCEDURE — 99214 OFFICE O/P EST MOD 30 MIN: CPT | Performed by: INTERNAL MEDICINE

## 2025-05-12 PROCEDURE — 3008F BODY MASS INDEX DOCD: CPT | Performed by: INTERNAL MEDICINE

## 2025-05-12 PROCEDURE — 1159F MED LIST DOCD IN RCRD: CPT | Performed by: INTERNAL MEDICINE

## 2025-05-12 PROCEDURE — G2211 COMPLEX E/M VISIT ADD ON: HCPCS | Performed by: INTERNAL MEDICINE

## 2025-05-12 PROCEDURE — 1160F RVW MEDS BY RX/DR IN RCRD: CPT | Performed by: INTERNAL MEDICINE

## 2025-05-12 RX ORDER — AMOXICILLIN 500 MG/1
TABLET, FILM COATED ORAL
COMMUNITY
Start: 2025-04-03

## 2025-05-12 RX ORDER — PNEUMOCOCCAL 20-VALENT CONJUGATE VACCINE 2.2; 2.2; 2.2; 2.2; 2.2; 2.2; 2.2; 2.2; 2.2; 2.2; 2.2; 2.2; 2.2; 2.2; 2.2; 2.2; 4.4; 2.2; 2.2; 2.2 UG/.5ML; UG/.5ML; UG/.5ML; UG/.5ML; UG/.5ML; UG/.5ML; UG/.5ML; UG/.5ML; UG/.5ML; UG/.5ML; UG/.5ML; UG/.5ML; UG/.5ML; UG/.5ML; UG/.5ML; UG/.5ML; UG/.5ML; UG/.5ML; UG/.5ML; UG/.5ML
0.5 INJECTION, SUSPENSION INTRAMUSCULAR ONCE
Qty: 0.5 ML | Refills: 0 | Status: SHIPPED | OUTPATIENT
Start: 2025-05-12 | End: 2025-05-12

## 2025-05-12 RX ORDER — HALOBETASOL PROPIONATE 0.5 MG/G
CREAM TOPICAL 2 TIMES DAILY
COMMUNITY

## 2025-05-12 NOTE — PROGRESS NOTES
"Subjective   Patient ID: Bob Smith is a 65 y.o. male who presents for Follow-up.    Here for semiannual visit  Recently did labs.  He notes that perhaps 3-4 times a year he has episodes with epididymitis.  He has followed with Dr. Snyder in the past and urology    Last fall he injured his left fifth finger twice within a week or so and it still a bit sore and swollen at times         Review of Systems    Objective   /76 (BP Location: Left arm, Patient Position: Sitting, BP Cuff Size: Large adult)   Pulse 76   Ht 1.867 m (6' 1.5\")   Wt 74.9 kg (165 lb 3.2 oz)   SpO2 100%   BMI 21.50 kg/m²     Physical Exam  Vitals reviewed.   Constitutional:       Appearance: Normal appearance.   HENT:      Head: Normocephalic and atraumatic.   Eyes:      General: No scleral icterus.        Right eye: No discharge.         Left eye: No discharge.      Extraocular Movements: Extraocular movements intact.      Conjunctiva/sclera: Conjunctivae normal.      Pupils: Pupils are equal, round, and reactive to light.   Cardiovascular:      Rate and Rhythm: Normal rate and regular rhythm.      Pulses: Normal pulses.      Heart sounds: Murmur heard.   Pulmonary:      Effort: Pulmonary effort is normal.      Breath sounds: Normal breath sounds. No wheezing or rhonchi.   Musculoskeletal:         General: No deformity or signs of injury. Normal range of motion.      Cervical back: Normal range of motion and neck supple. No rigidity or tenderness.      Comments: Left fifth PIP joint slight soft tissue swelling but flexion and extension appeared intact without finger deformity otherwise   Lymphadenopathy:      Cervical: No cervical adenopathy.   Skin:     General: Skin is warm and dry.      Findings: No rash.   Neurological:      General: No focal deficit present.      Mental Status: He is alert and oriented to person, place, and time. Mental status is at baseline.      Cranial Nerves: No cranial nerve deficit.      Sensory: No " sensory deficit.      Gait: Gait normal.   Psychiatric:         Mood and Affect: Mood normal.         Behavior: Behavior normal.         Thought Content: Thought content normal.         Judgment: Judgment normal.         Assessment/Plan   Problem List Items Addressed This Visit           ICD-10-CM    Wears glasses Z97.3    Vitamin D deficiency E55.9    Tubular adenoma of colon D12.6    Psoriasis L40.9    Relevant Medications    halobetasol propion-tazarotene 0.01-0.045 % lotion    Benign enlargement of prostate (Chronic) N40.0    H/O mitral valve repair (Chronic) Z98.890    Open-angle glaucoma of right eye H40.10X0     Other Visit Diagnoses         Codes      Dyslipidemia, goal LDL below 100    -  Primary E78.5      Immunization due     Z23    Relevant Medications    pneumoc 20-aston conj-dip cr,PF, (Prevnar 20, PF,) 0.5 mL vaccine      Elevated glucose     R73.09    Relevant Orders    Glucose, fasting    Hemoglobin A1C                 Portions of this encounter note have been copied from my previous note dated 11/11/24  , which have been updated where appropriate and all reflect my current medical decision making from today.       Labs from 4/30/25  rev'd       Sleep Aids:   What is the patient's goal of therapy? To help w/ sleep  Is this being achieved with current treatment? yes    Activities of Daily Living:   Is your overall impression that this patient is benefiting (symptom reduction outweighs side effects) from sleep aid therapy? Yes     1. Physical Functioning: Better  2. Family Relationship: Better  3. Social Relationship: Better  4. Mood: Better  5. Sleep Patterns: Better  6. Overall Function: Better      CSA:  11/11/24  PDMP: 5/12/25  UDS:   10/19/24         Hx  slightly dilated aortic arch history of mitral valve repair 2007 -        he saw his cardiologist, Dr. Montes for an echo in the spring       Coincidentally he will see him tomorrow 5/7/2024, and his daughter will do the echocardiogram            He  saw Dr. Montes, and did echo 5/24, next appt late Nov '24 for semiannual appt.              Cardiology visits continue-no recent concerns      Dyslipidemia- 10 year cardiac risk favorable late 2016. We'll follow. Annual spring lipids 4/22 show LDL at 100, but HDL a bit worse than last time.  Goal LDL under 100  10/23-LDL 97   5/24;   5/25-LDL 87-improved with diet changes.      exercise routine-he continues to work out regularly, most recently with walking-60 to 90 minutes 5 days weekly in the [place].  He also does stretching           He continues to remain quite active with stretching- walks 1-1.5 hrs, 4-5 x wk       Acid reflux-stable with medication. He will try to use this as needed. No swallowing difficulties. He notes this is almost over seasonal issue.              uses Pepcid AC as needed, typically seasonally    Irritable bowel symptoms-improved following recommendation from gastro practitioner-            he is on IBgard which helps      Colon polyps / history of external hemorrhoids-            Colonoscopy late November 2023- 6 polyps removed.           Next colonoscopy 5 yrs - Nov '28       Elevated PSA velocity with BPH-negative biopsy Jan '21 per Dr. Snyder. He saw him back in July 2021- PSA had decreased-he recommended following this every 6 months-ordered   PSA up again in 4/22. He'll see Dr. Snyder in several weeks as scheduled. I told him I am concerned about testosterone replacement therapy in a 60 year-old. He will specifically asked her Dr. Snyder if this testosterone therapy is still a safe option for him.               10/23-he discontinued testosterone.  PSA stable 10/23                  11/24-PSA remains stable.                5/25-PSA similar to where it has been over the last 12 months.  Will continue to follow    Epididymitis concern-recurrent-             5/25-he has what he calls epididymitis 3-4 times yearly lasting several days.  I told him it is rather uncommon  to have such a recurrent issue with epididymitis and that possibly this represents a remote vasectomy postop issue-urology visits have concluded for now as his urologist, Dr. Snyder moved away.  If symptoms change or become more frequent he will [place] back with urology.  Suggested when this does flare, he should in addition to warm baths, consider consistent NSAID use with 2 Aleve with food twice daily for 3 to 4 days.  Again he will [place] back to urology with concerns                  Hx hypogonadism/ infrequent epididymitis/ erectile dysfunction-though he was on testosterone for years-this was discontinued late 2022 11/24-testosterone normal     Benign appearing Left renal cyst-unremarkable renal ultrasound late 2016. He notes in 2021- at [facility] another renal ultrasound was done after his cardiology visit and his echo showed a concern.     History of Right lateral lumbar pain/lumbar arthritis-caution with lifting standing and positioning and his chores around the house and follow-up with concerns. not an active issue now. He understands importance of consistent stretching             No issues with his back presently     Left fifth finger injury-this was injured twice in the fall 2024 with some residual soft tissue swelling.  As it has been over 6 months, and he has full flexion extension, with only minimal PIP swelling, at this point I suggested following this.  He will [place] back with [facility] with concerns     Fallen arches -he has had metatarsalgia before as well as toe tingling weightbearing. Encouraged optimal fitting shoes with optimal arch support. Presently less symptomatic. Have encouraged podiatry follow-up in the past with concerns   Much improved with inserts from [employer] shoe store in [place]- he goes out every 12-18 months for new inserts    Toenail concern - he did see a podiatrist to trim his 1st right toenail. He'll return as needed.     Plantar  wart-in the past, noted fifth MTP area suggested soaking and pumice stone. Encouraged podiatry for ingrown toenail     Right shoulder soreness - he saw [facility] summer '20. X-ray OK.          Improved after stopping tennis     Left knee DJD/history of knee effusion-not problematic. He will see orthopedics as needed, following injection per Dr. Corral in 2017.          Knee better since he gave up running. Walks weekly      Vitamin D deficiency- encouraged patient to continue vitamin D daily as ordered. Will consider vitamin D level regularly.             He is off extra vitamin D because of dyspepsia.  He will try a multivitamin with his 10/23 vitamin D borderline low.        Psoriasis /seborrhea-  RE Skin care- Sees the NP in dermatology annually- [facility].        Groin psoriasis main issue, late fall & winter.  Topicals remain helpful          He'll set up appt as it's been some time since last one.         Last visit spring '23. Diet helps to keep psoriasis in check               5/25-he requested a refill on his psoriasis medication-refilled     Cerumen concern-he prefers to follow with his ENT doctor, Dr. Raymond semiannually.                10/23-no cerumen on exam.  He [facility] with [facility].     Dental care-encouraged biannually.            He goes 3 times yearly; UTD.     Glasses / contacts / Vision care-he will continue with Dr. Hickman - optometrist annually-            Uses progressive lenses glasses as well as  monofocal contacts 3 x wk.           Visits annually each spring - next in spring '25        open-angle glaucoma-right eye-he now is on eyedrops under the direction of Dr. Elvis Cohen            appt Jan '25 for semiannual appt. Using latanoprost in right eye             He will continue medications and follow-up     Blepharitis- ongoing for approximately 4 weeks. Encouraged using a warm compress. He actually describes what may be a right lower eyelid  bridgett. Resolved. Again he will follow-up with his eye doctor with concerns        11/24- tacrolimus recently refilled        [employer] / part time job-- retired at the end of January 2021. 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 booster shot 9/24    RSV vaccination - 5/24    Prevnar 20 - rec'd 5/25     Shingrix series updated 2019       Follow-up every 6 months, sooner as needed     Charting was completed using voice recognition technology and may include unintended errors.

## 2025-05-28 DIAGNOSIS — G47.00 INSOMNIA, UNSPECIFIED TYPE: Chronic | ICD-10-CM

## 2025-05-28 NOTE — TELEPHONE ENCOUNTER
Let patient know that prescriptions were pended to Dr. Browning for approval. He will check with his pharmacy later this week.

## 2025-05-29 RX ORDER — ZOLPIDEM TARTRATE 5 MG/1
5 TABLET ORAL NIGHTLY PRN
Qty: 90 TABLET | Refills: 0 | Status: SHIPPED | OUTPATIENT
Start: 2025-05-29

## 2025-09-03 DIAGNOSIS — G47.00 INSOMNIA, UNSPECIFIED TYPE: Chronic | ICD-10-CM

## 2025-09-03 RX ORDER — BETAMETHASONE DIPROPIONATE 0.5 MG/G
CREAM TOPICAL
COMMUNITY
Start: 2025-08-06

## 2025-09-03 RX ORDER — FLUOCINOLONE ACETONIDE 0.11 MG/ML
OIL TOPICAL
COMMUNITY
Start: 2025-07-14

## 2025-09-03 RX ORDER — TACROLIMUS 1 MG/G
OINTMENT TOPICAL
COMMUNITY
Start: 2025-07-16

## 2025-09-04 RX ORDER — ZOLPIDEM TARTRATE 5 MG/1
5 TABLET ORAL NIGHTLY PRN
Qty: 90 TABLET | Refills: 0 | Status: SHIPPED | OUTPATIENT
Start: 2025-09-04

## 2025-11-12 ENCOUNTER — APPOINTMENT (OUTPATIENT)
Dept: PRIMARY CARE | Facility: CLINIC | Age: 65
End: 2025-11-12
Payer: COMMERCIAL

## 2026-05-18 ENCOUNTER — APPOINTMENT (OUTPATIENT)
Dept: PRIMARY CARE | Facility: CLINIC | Age: 66
End: 2026-05-18
Payer: COMMERCIAL